# Patient Record
Sex: FEMALE | Race: WHITE | NOT HISPANIC OR LATINO | Employment: PART TIME | URBAN - METROPOLITAN AREA
[De-identification: names, ages, dates, MRNs, and addresses within clinical notes are randomized per-mention and may not be internally consistent; named-entity substitution may affect disease eponyms.]

---

## 2018-01-11 ENCOUNTER — RECORDS - HEALTHEAST (OUTPATIENT)
Dept: LAB | Facility: CLINIC | Age: 28
End: 2018-01-11

## 2018-01-12 LAB
HSV1 IGG SERPL QL IA: NEGATIVE
HSV2 IGG SERPL QL IA: NEGATIVE

## 2018-01-13 LAB
HSV 1, PCR - HISTORICAL: NEGATIVE
HSV TYPE 2 PCR: NEGATIVE
SPECIMEN SOURCE: NORMAL

## 2020-08-14 ENCOUNTER — RECORDS - HEALTHEAST (OUTPATIENT)
Dept: LAB | Facility: CLINIC | Age: 30
End: 2020-08-14

## 2020-08-14 LAB — C REACTIVE PROTEIN LHE: <0.1 MG/DL (ref 0–0.8)

## 2022-02-02 ENCOUNTER — LAB REQUISITION (OUTPATIENT)
Dept: LAB | Facility: CLINIC | Age: 32
End: 2022-02-02

## 2022-02-02 DIAGNOSIS — J02.9 ACUTE PHARYNGITIS, UNSPECIFIED: ICD-10-CM

## 2022-02-02 PROCEDURE — 87081 CULTURE SCREEN ONLY: CPT | Performed by: PHYSICIAN ASSISTANT

## 2022-02-05 LAB — BACTERIA SPEC CULT: NORMAL

## 2022-11-01 PROCEDURE — 99284 EMERGENCY DEPT VISIT MOD MDM: CPT

## 2022-11-02 ENCOUNTER — HOSPITAL ENCOUNTER (EMERGENCY)
Facility: HOSPITAL | Age: 32
Discharge: HOME | End: 2022-11-02
Attending: EMERGENCY MEDICINE | Admitting: EMERGENCY MEDICINE
Payer: COMMERCIAL

## 2022-11-02 VITALS
WEIGHT: 160 LBS | BODY MASS INDEX: 25.06 KG/M2 | DIASTOLIC BLOOD PRESSURE: 71 MMHG | TEMPERATURE: 98.7 F | RESPIRATION RATE: 22 BRPM | OXYGEN SATURATION: 100 % | HEART RATE: 98 BPM | SYSTOLIC BLOOD PRESSURE: 125 MMHG

## 2022-11-02 DIAGNOSIS — F41.9 ANXIETY: ICD-10-CM

## 2022-11-02 PROBLEM — Z87.891 FORMER SMOKER: Status: ACTIVE | Noted: 2019-05-21

## 2022-11-02 PROBLEM — F40.8 OTHER PHOBIC ANXIETY DISORDERS: Status: ACTIVE | Noted: 2019-05-21

## 2022-11-02 PROBLEM — R00.2 HEART PALPITATIONS: Status: ACTIVE | Noted: 2019-05-21

## 2022-11-02 PROBLEM — R00.0 TACHYCARDIA: Status: ACTIVE | Noted: 2019-05-21

## 2022-11-02 RX ORDER — HYDROXYZINE HYDROCHLORIDE 25 MG/1
25 TABLET, FILM COATED ORAL EVERY 6 HOURS PRN
Qty: 12 TABLET | Refills: 0 | Status: SHIPPED | OUTPATIENT
Start: 2022-11-02

## 2022-11-02 RX ORDER — LORAZEPAM 0.5 MG/1
0.5 TABLET ORAL EVERY 6 HOURS PRN
Qty: 6 TABLET | Refills: 0 | Status: SHIPPED | OUTPATIENT
Start: 2022-11-02

## 2022-11-02 RX ORDER — BUSPIRONE HYDROCHLORIDE 5 MG/1
5 TABLET ORAL 2 TIMES DAILY
Qty: 14 TABLET | Refills: 0 | Status: SHIPPED | OUTPATIENT
Start: 2022-11-02 | End: 2024-03-18

## 2022-11-02 ASSESSMENT — ACTIVITIES OF DAILY LIVING (ADL): ADLS_ACUITY_SCORE: 33

## 2022-11-02 NOTE — DISCHARGE INSTRUCTIONS
The Ativan is to help break cycle of anxiety, you take 1 dose every 6 hours, I do not recommend taking this daily however.    Try the hydroxyzine this may also help with sleep as well.    Please talk to therapist about alternatives to inpatient admission, which could be day programs, outpatient intensive therapy.    It will be important to move forward with a psychiatrist, they are the best resource for medications.

## 2022-11-02 NOTE — ED PROVIDER NOTES
EMERGENCY DEPARTMENT ENCOUNTER      NAME: Carrol Momin  AGE: 32 year old female  YOB: 1990  MRN: [id number]  EVALUATION DATE & TIME: 2022 12:29 AM    PCP: Clinic, [facility] [facility]    ED PROVIDER: Min oTny M.D.      Chief Complaint   Patient presents with     Mental Health Problem         FINAL IMPRESSION:  1. Anxiety          ED COURSE & MEDICAL DECISION MAKING:    Pertinent Labs & Imaging studies reviewed. (See chart for details)  ED Course as of 22 0610      0104 Patient is a 32-year-old with history of general anxiety disorder, here with his significant anxiety, baseline phobia of taking medications.  She would like refills of her medications that  2 years ago.  She has few doses of hydroxyzine, Ativan left.  Reports never taking any of the Ativan at home.  She is anxious, rubbing her hands on her knees.  Denies SI.  I encouraged a dose of Ativan here which she declined, but I sent her home with a few tablets of both medications as well as low-dose BuSpar.  I encouraged her to follow-up with psychiatry and her therapist for continued work with her anxiety as an outpatient.  We discussed return precautions.         Additional ED Course Timestamps:  12:47 AM I met with the patient, obtained history, performed an initial exam, and discussed options and plan for diagnostics and treatment here in the ED.  1:02 AM We discussed the plan for discharge and the patient is agreeable. Reviewed supportive cares, symptomatic treatment, outpatient follow up, and reasons to return to the Emergency Department. Patient to be discharged by ED RN.         At the conclusion of the encounter I discussed the results of all of the tests and the disposition. The questions were answered. The patient or family acknowledged understanding and was agreeable with the care plan.       MEDICATIONS GIVEN IN THE EMERGENCY:  Medications - No data to display      NEW PRESCRIPTIONS  STARTED AT TODAY'S ER VISIT  Discharge Medication List as of 11/2/2022  1:05 AM      START taking these medications    Details   busPIRone (BUSPAR) 5 MG tablet Take 1 tablet (5 mg) by mouth 2 times daily, Disp-14 tablet, R-0, Local Print      hydrOXYzine (ATARAX) 25 MG tablet Take 1 tablet (25 mg) by mouth every 6 hours as needed for anxiety, Disp-12 tablet, R-0, Local Print      !! LORazepam (ATIVAN) 0.5 MG tablet Take 1 tablet (0.5 mg) by mouth every 6 hours as needed for anxiety, Disp-6 tablet, R-0, Local Print       !! - Potential duplicate medications found. Please discuss with provider.             =================================================================    HPI    Patient information was obtained from: Patient     Use of : N/A         Carrol Momin is a 32 year old female with a pertinent history of general anxiety disorder, agoraphobia and pharmacophobia, who presents to this ED for evaluation of anxiety.     Today, patient states she was panicking all day.  She has been seen for similar episode in 2019 and was prescribed hydroxyzine and Ativan but states she has a fear over taking any medications, and has not taken any of the medications. Patient has been seeing a therapist for some time and states she has been doing better but in recent times, patient notes an increase of stress in her life causing her to be more anxious. She has not been able to sleep well. Patient was at [facility] earlier today and wanted to get admitted.     Of note, patient does not have a psychiatrist. Patient denies suicidal ideation.       Mental Health Risk Assessment      PSS-3    Date and Time Over the past 2 weeks have you felt down, depressed, or hopeless? Over the past 2 weeks have you had thoughts of killing yourself? Have you ever attempted to kill yourself? When did this last happen? User   11/01/22 3329 yes no yes more than 6 months ago       C-SSRS ([place])    Date and Time Q1 Wished to be Dead  (Past Month) Q2 Suicidal Thoughts (Past Month) Q3 Suicidal Thought Method Q4 Suicidal Intent without Specific Plan Q5 Suicide Intent with Specific Plan Q6 Suicide Behavior (Lifetime) Within the Past 3 Months? RETIRED: Level of Risk per Screen Screening Not Complete User   11/02/22 0114 no no no no no no -- -- -- DMV                  REVIEW OF SYSTEMS   Review of Systems   Psychiatric/Behavioral: Negative for suicidal ideas.        Positive for anxious   All other systems reviewed and are negative.       PAST MEDICAL HISTORY:  History reviewed. No pertinent past medical history.    PAST SURGICAL HISTORY:  History reviewed. No pertinent surgical history.        CURRENT MEDICATIONS:    No current facility-administered medications for this encounter.     Current Outpatient Medications   Medication     busPIRone (BUSPAR) 5 MG tablet     hydrOXYzine (ATARAX) 25 MG tablet     LORazepam (ATIVAN) 0.5 MG tablet     LORazepam (ATIVAN) 1 MG tablet       ALLERGIES:  Allergies   Allergen Reactions     Pineapple Unknown       FAMILY HISTORY:  History reviewed. No pertinent family history.    SOCIAL HISTORY:   Social History     Socioeconomic History     Marital status: Single       VITALS:  /71   Pulse 98   Temp 98.7  F (37.1  C) (Oral)   Resp 22   Wt 72.6 kg (160 lb)   SpO2 100%   BMI 25.06 kg/m      PHYSICAL EXAM    Constitutional: Well developed, well nourished. Anxious appearing  HENT: Normocephalic, atraumatic, mucous membranes moist, nose normal  Eyes: PERRL, EOMI, conjunctiva normal, no discharge.  Neck- Supple, gross ROM intact.   Respiratory: Normal work of breathing, normal rate, speaks in full sentences  Cardiovascular: Normal heart rate  Musculoskeletal: Moving all 4 extremities intentionally and without pain.  Neurologic: Alert & oriented x 3, cranial nerves grossly intact. Normal gross coordination  Psychiatric: Affect normal, cooperative. Denies suicidal ideation.        LAB:  All pertinent labs reviewed  and interpreted.  Labs Ordered and Resulted from Time of ED Arrival to Time of ED Departure - No data to display    RADIOLOGY:  Reviewed all pertinent imaging. Please see official radiology report.  No orders to display       EKG:    All EKG interpretations will be found in ED course above.    PROCEDURES:   None      I, Melany Bauer am serving as a scribe to document services personally performed by Dr. Min Tony based on my observation and the provider's statements to me. I, Min Tony MD attest that Melany Bauer is acting in a scribe capacity, has observed my performance of the services and has documented them in accordance with my direction.    Min Tony M.D.  Emergency Medicine  [facility] EMERGENCY DEPARTMENT  [facility] [facility] [address]  [phone]  Dept: [phone]     Min Tony MD  11/02/22 0611

## 2022-11-02 NOTE — ED TRIAGE NOTES
Pt arrives with her boyfriend from home for evaluation of a mental health crisis. Pt feels like she is having a severe panic attack, it started around 2pm today. She was able to see her therapist around 6pm who helped calm her down temperoraly. Her therapist recommend she come in to be seen and placed on mediations (reccomending Buspar). Pt is currently not taking anything, as she has a fear of medications. She has prescriptions from 2019 for Hydroxyzine and Ativan for in emergency's, but she has not been able to get herself to take them and is also worried they are  by now. She denies any SI at this time, which she reports she often does have with these attacks. SI attempt within the last year. Pt is hoping to receive inpatient MH treatment.

## 2022-12-30 ENCOUNTER — LAB REQUISITION (OUTPATIENT)
Dept: LAB | Facility: CLINIC | Age: 32
End: 2022-12-30
Payer: COMMERCIAL

## 2022-12-30 ENCOUNTER — LAB REQUISITION (OUTPATIENT)
Dept: LAB | Facility: CLINIC | Age: 32
End: 2022-12-30

## 2022-12-30 DIAGNOSIS — Z12.4 ENCOUNTER FOR SCREENING FOR MALIGNANT NEOPLASM OF CERVIX: ICD-10-CM

## 2022-12-30 DIAGNOSIS — J02.9 ACUTE PHARYNGITIS, UNSPECIFIED: ICD-10-CM

## 2022-12-30 PROCEDURE — G0145 SCR C/V CYTO,THINLAYER,RESCR: HCPCS | Performed by: FAMILY MEDICINE

## 2022-12-30 PROCEDURE — U0003 INFECTIOUS AGENT DETECTION BY NUCLEIC ACID (DNA OR RNA); SEVERE ACUTE RESPIRATORY SYNDROME CORONAVIRUS 2 (SARS-COV-2) (CORONAVIRUS DISEASE [COVID-19]), AMPLIFIED PROBE TECHNIQUE, MAKING USE OF HIGH THROUGHPUT TECHNOLOGIES AS DESCRIBED BY CMS-2020-01-R: HCPCS | Mod: ORL | Performed by: FAMILY MEDICINE

## 2022-12-30 PROCEDURE — 87624 HPV HI-RISK TYP POOLED RSLT: CPT | Performed by: FAMILY MEDICINE

## 2022-12-31 LAB — SARS-COV-2 RNA RESP QL NAA+PROBE: NEGATIVE

## 2023-01-03 LAB
BKR LAB AP GYN ADEQUACY: NORMAL
BKR LAB AP GYN INTERPRETATION: NORMAL
BKR LAB AP HPV REFLEX: NORMAL
BKR LAB AP LMP: NORMAL
BKR LAB AP PREVIOUS ABNORMAL: NORMAL
PATH REPORT.COMMENTS IMP SPEC: NORMAL
PATH REPORT.COMMENTS IMP SPEC: NORMAL
PATH REPORT.RELEVANT HX SPEC: NORMAL

## 2023-01-04 LAB
HUMAN PAPILLOMA VIRUS 16 DNA: NEGATIVE
HUMAN PAPILLOMA VIRUS 18 DNA: NEGATIVE
HUMAN PAPILLOMA VIRUS FINAL DIAGNOSIS: ABNORMAL
HUMAN PAPILLOMA VIRUS OTHER HR: POSITIVE

## 2024-01-05 ENCOUNTER — TELEPHONE (OUTPATIENT)
Dept: UROLOGY | Facility: CLINIC | Age: 34
End: 2024-01-05
Payer: COMMERCIAL

## 2024-01-05 NOTE — TELEPHONE ENCOUNTER
[facility] Call Center    Phone Message    May a detailed message be left on voicemail: yes     Reason for Call: hormone therapy and wants appt with Janny, please advise   Legal name being Varinder and has brought in papers  Action Taken: Other: urology    Travel Screening: Not Applicable

## 2024-01-08 ENCOUNTER — TELEPHONE (OUTPATIENT)
Dept: PLASTIC SURGERY | Facility: CLINIC | Age: 34
End: 2024-01-08
Payer: COMMERCIAL

## 2024-01-08 NOTE — CONFIDENTIAL NOTE
Writer CURTIS re: request to schedule gender care. Pt requested HRT and consult with Dr. Soliman. Legal name is Varinder, but pt needs to send us court order.

## 2024-01-08 NOTE — CONFIDENTIAL NOTE
Pt called back to discuss HRT. Writer discussed options and pt selected [facility] as closest and ideal option. Pt reports having agoraphobia so prefers the clinic closer to their home. Provider gave Varinder phone number to call for scheduling and pt to schedule with Dr. Weber.

## 2024-01-09 ENCOUNTER — HOSPITAL ENCOUNTER (EMERGENCY)
Facility: HOSPITAL | Age: 34
Discharge: HOME | End: 2024-01-09
Attending: EMERGENCY MEDICINE | Admitting: EMERGENCY MEDICINE
Payer: COMMERCIAL

## 2024-01-09 ENCOUNTER — LAB REQUISITION (OUTPATIENT)
Dept: LAB | Facility: CLINIC | Age: 34
End: 2024-01-09

## 2024-01-09 VITALS
HEART RATE: 92 BPM | RESPIRATION RATE: 16 BRPM | HEIGHT: 68 IN | DIASTOLIC BLOOD PRESSURE: 70 MMHG | OXYGEN SATURATION: 99 % | SYSTOLIC BLOOD PRESSURE: 137 MMHG | BODY MASS INDEX: 23.19 KG/M2 | TEMPERATURE: 98.4 F | WEIGHT: 153 LBS

## 2024-01-09 DIAGNOSIS — N30.01 ACUTE CYSTITIS WITH HEMATURIA: ICD-10-CM

## 2024-01-09 DIAGNOSIS — R39.9 UNSPECIFIED SYMPTOMS AND SIGNS INVOLVING THE GENITOURINARY SYSTEM: ICD-10-CM

## 2024-01-09 PROCEDURE — 250N000009 HC RX 250: Performed by: EMERGENCY MEDICINE

## 2024-01-09 PROCEDURE — 96372 THER/PROPH/DIAG INJ SC/IM: CPT | Performed by: EMERGENCY MEDICINE

## 2024-01-09 PROCEDURE — 99284 EMERGENCY DEPT VISIT MOD MDM: CPT | Mod: 25

## 2024-01-09 PROCEDURE — 87186 SC STD MICRODIL/AGAR DIL: CPT | Performed by: STUDENT IN AN ORGANIZED HEALTH CARE EDUCATION/TRAINING PROGRAM

## 2024-01-09 PROCEDURE — 250N000011 HC RX IP 250 OP 636: Performed by: EMERGENCY MEDICINE

## 2024-01-09 PROCEDURE — 250N000013 HC RX MED GY IP 250 OP 250 PS 637: Performed by: EMERGENCY MEDICINE

## 2024-01-09 RX ORDER — PHENAZOPYRIDINE HYDROCHLORIDE 200 MG/1
200 TABLET, FILM COATED ORAL 3 TIMES DAILY
Qty: 9 TABLET | Refills: 0 | Status: SHIPPED | OUTPATIENT
Start: 2024-01-09 | End: 2024-01-12

## 2024-01-09 RX ORDER — PHENAZOPYRIDINE HYDROCHLORIDE 100 MG/1
200 TABLET, FILM COATED ORAL ONCE
Status: COMPLETED | OUTPATIENT
Start: 2024-01-09 | End: 2024-01-09

## 2024-01-09 RX ORDER — CEFDINIR 300 MG/1
300 CAPSULE ORAL 2 TIMES DAILY
Qty: 14 CAPSULE | Refills: 0 | Status: SHIPPED | OUTPATIENT
Start: 2024-01-09 | End: 2024-01-16

## 2024-01-09 RX ORDER — KETOROLAC TROMETHAMINE 30 MG/ML
30 INJECTION, SOLUTION INTRAMUSCULAR; INTRAVENOUS ONCE
Status: COMPLETED | OUTPATIENT
Start: 2024-01-09 | End: 2024-01-09

## 2024-01-09 RX ORDER — CEFDINIR 300 MG/1
300 CAPSULE ORAL ONCE
Status: DISCONTINUED | OUTPATIENT
Start: 2024-01-09 | End: 2024-01-09

## 2024-01-09 RX ADMIN — KETOROLAC TROMETHAMINE 30 MG: 30 INJECTION, SOLUTION INTRAMUSCULAR; INTRAVENOUS at 22:07

## 2024-01-09 RX ADMIN — PHENAZOPYRIDINE 200 MG: 100 TABLET ORAL at 22:05

## 2024-01-09 RX ADMIN — LIDOCAINE HYDROCHLORIDE 1 G: 10 INJECTION, SOLUTION INFILTRATION; PERINEURAL at 22:14

## 2024-01-10 NOTE — ED TRIAGE NOTES
Patient was seen at clinic 1 hour ago and was diagnosed with a UTI. Abx was sent to pharmacy. Patient states when she returned home there was blood in her urine and bladder became very painful. Patient states nurse referred her to ER if she noted blood in her urine.     Triage Assessment (Adult)       Row Name 01/09/24 5938          Triage Assessment    Airway WDL WDL        Respiratory WDL    Respiratory WDL WDL        Skin Circulation/Temperature WDL    Skin Circulation/Temperature WDL WDL        Cardiac WDL    Cardiac WDL WDL        Peripheral/Neurovascular WDL    Peripheral Neurovascular WDL WDL        Cognitive/Neuro/Behavioral WDL    Cognitive/Neuro/Behavioral WDL WDL

## 2024-01-10 NOTE — ED PROVIDER NOTES
EMERGENCY DEPARTMENT ENCOUNTER      NAME: Varinder Momin  AGE: 33 year old adult  YOB: 1990  MRN: [id number]  EVALUATION DATE & TIME: No admission date for patient encounter.    PCP: [facility]    ED PROVIDER: Kristine Bradley MD    Chief Complaint   Patient presents with    Hematuria         FINAL IMPRESSION:  1. Acute cystitis with hematuria          ED COURSE & MEDICAL DECISION MAKING:    Pertinent Labs & Imaging studies reviewed. (See chart for details)  33 year old adult with history of male to female transgender patient, anxiety who presents to the Emergency Department for evaluation of several days of small-volume urinary frequency dysuria.  Seen in entire today diagnosed with UTI and prescription for antibiotic called into pharmacy.  In the interim however patient has developed hematuria.  Symptoms you can seem consistent with urinary tract infection.  I am not able to see the urinalysis that was obtained from clinic earlier today but I do see a urine culture in progress.  Patient is clearly symptomatic and has not received any antibiotics yet.  Given dose of IM Rocephin here as well as IM Toradol and Pyridium.  I am not sure what they called into [facility].  Does have history of insertive anal intercourse would certainly need to [facility] for E. coli.  Discharged home with prescription for Omnicef.  Warning signs return to ED discussed.      ED Course as of 01/09/24 2237 Tue Jan 09, 2024 2121 I introduced myself to the patient, obtained patient history, performed a physical exam, and discussed plan for ED workup including potential diagnostic laboratory/imaging studies and interventions.     2136 We discussed the plan for discharge and the patient is agreeable. Reviewed supportive cares, symptomatic treatment, outpatient follow up, and reasons to return to the Emergency Department. Patient to be discharged by ED RN.          Medical Decision Making    History:  Supplemental  history from: Family Member/Significant Other  External Record(s) reviewed: Prior Labs: Urine culture today in process not resulted    Work Up:  Chart documentation includes differential considered and any EKGs or imaging independently interpreted by provider, see MDM  In additional to work up documented, I considered the following work up: see MDM    External consultation:  Discussion of management with another provider: N/A    Complicating factors:  Care impacted by chronic illness: Mental Health  Care affected by social determinants of health: Access to Medical Care and Access to Affordable Health Care    Disposition considerations: Discharge. I prescribed additional prescription strength medication(s) as charted. See documentation for any additional details.        At the conclusion of the encounter I discussed the results of all of the tests and the disposition. The questions were answered. The patient or family acknowledged understanding and was agreeable with the care plan.      MEDICATIONS GIVEN IN THE EMERGENCY:  Medications   cefTRIAXone (ROCEPHIN) 1 g in lidocaine injection (1 g Intramuscular $Given 1/9/24 2214)   ketorolac (TORADOL) injection 30 mg (30 mg Intramuscular $Given 1/9/24 2207)   phenazopyridine (PYRIDIUM) tablet 200 mg (200 mg Oral $Given 1/9/24 2205)       NEW PRESCRIPTIONS STARTED AT TODAY'S ER VISIT  Discharge Medication List as of 1/9/2024  9:49 PM        START taking these medications    Details   cefdinir (OMNICEF) 300 MG capsule Take 1 capsule (300 mg) by mouth 2 times daily for 7 days, Disp-14 capsule, R-0, Local Print      phenazopyridine (PYRIDIUM) 200 MG tablet Take 1 tablet (200 mg) by mouth 3 times daily for 3 days, Disp-9 tablet, R-0, Local Print                =================================================================    HPI    Patient information was obtained from: Patient     Use of Intrepreter: N/A       Varinder Momin is a 33 year old adult with pertinent medical  "history of anxiety who presents with UTI    Patient reports having dysuria for days saying \"it fells like I'm pissing glass\". Patient went to clinic PTA and was diagnosed with UTI and was given antibiotics. Patient went home and then started having hematuria, so she came to ER. Patient also endorses lower abdominal pain, and loss of appetite. Patient also had insertive anal intercourse. Patient has had loose stools. Patient denies flank pain, nausea, vomiting.       PAST MEDICAL HISTORY:  No past medical history on file.    PAST SURGICAL HISTORY:  No past surgical history on file.    CURRENT MEDICATIONS:    Prior to Admission Medications   Prescriptions Last Dose Informant Patient Reported? Taking?   LORazepam (ATIVAN) 0.5 MG tablet   No No   Sig: Take 1 tablet (0.5 mg) by mouth every 6 hours as needed for anxiety   LORazepam (ATIVAN) 1 MG tablet   No No   Sig: [LORAZEPAM (ATIVAN) 1 MG TABLET] Take 0.5 tablets (0.5 mg total) by mouth every 8 (eight) hours as needed for anxiety.   busPIRone (BUSPAR) 5 MG tablet   No No   Sig: Take 1 tablet (5 mg) by mouth 2 times daily   hydrOXYzine (ATARAX) 25 MG tablet   No No   Sig: Take 1 tablet (25 mg) by mouth every 6 hours as needed for anxiety      Facility-Administered Medications: None       ALLERGIES:  Allergies   Allergen Reactions    Pineapple Unknown       FAMILY HISTORY:  No family history on file.    SOCIAL HISTORY:        VITALS:  Patient Vitals for the past 24 hrs:   BP Temp Temp src Pulse Resp SpO2 Height Weight   01/09/24 2217 137/70 -- -- 92 16 99 % -- --   01/09/24 2112 133/65 98.4  F (36.9  C) Temporal 84 18 100 % 1.727 m (5' 8\") 69.4 kg (153 lb)       PHYSICAL EXAM    General Appearance: Well-appearing, well-nourished, no acute distress   Head:  Normocephalic  Cardio:  Regular rate and rhythm  Pulm:  No respiratory distress  Back:  No CVA tenderness, normal ROM  Abdomen:  Mild suprapubic tenderness.  non distended,no rebound or guarding.  Extremities: Normal " gait  Neuro:  Alert and oriented ×3      The creation of this record is based on the scribe s observations of the work being performed by Kristine Bradley MD and the provider s statements to them. It was created on her behalf by Johnny Young, a trained medical scribe. This document has been checked and approved by the attending provider.    Kristine Bradley MD  Emergency Medicine  [facility] EMERGENCY DEPARTMENT  [address] [place] [id number] [phone]  Dept: [phone]       Kristine Bradley MD  01/09/24 8634

## 2024-01-10 NOTE — DISCHARGE INSTRUCTIONS
Ibuprofen 800 mg 3 times daily as needed for pain.  Tylenol 1000 mg 4 times daily as needed for pain.  Pyridium as needed for painful urination.  Take antibiotics as prescribed.

## 2024-01-11 ENCOUNTER — LAB REQUISITION (OUTPATIENT)
Dept: LAB | Facility: CLINIC | Age: 34
End: 2024-01-11

## 2024-01-11 PROCEDURE — 87329 GIARDIA AG IA: CPT | Performed by: STUDENT IN AN ORGANIZED HEALTH CARE EDUCATION/TRAINING PROGRAM

## 2024-01-12 LAB — BACTERIA UR CULT: ABNORMAL

## 2024-01-15 LAB
C PARVUM AG STL QL IA: NEGATIVE
G LAMBLIA AG STL QL IA: NEGATIVE

## 2024-03-18 ENCOUNTER — OFFICE VISIT (OUTPATIENT)
Dept: FAMILY MEDICINE | Facility: CLINIC | Age: 34
End: 2024-03-18
Payer: COMMERCIAL

## 2024-03-18 ENCOUNTER — PATIENT OUTREACH (OUTPATIENT)
Dept: ONCOLOGY | Facility: CLINIC | Age: 34
End: 2024-03-18

## 2024-03-18 VITALS
SYSTOLIC BLOOD PRESSURE: 128 MMHG | HEART RATE: 84 BPM | WEIGHT: 151 LBS | OXYGEN SATURATION: 99 % | BODY MASS INDEX: 22.36 KG/M2 | RESPIRATION RATE: 18 BRPM | HEIGHT: 69 IN | DIASTOLIC BLOOD PRESSURE: 60 MMHG | TEMPERATURE: 99 F

## 2024-03-18 DIAGNOSIS — Z80.3 FAMILY HISTORY OF MALIGNANT NEOPLASM OF BREAST: ICD-10-CM

## 2024-03-18 DIAGNOSIS — F33.1 MODERATE RECURRENT MAJOR DEPRESSION (H): ICD-10-CM

## 2024-03-18 DIAGNOSIS — F41.1 GAD (GENERALIZED ANXIETY DISORDER): ICD-10-CM

## 2024-03-18 DIAGNOSIS — F64.9 GENDER DYSPHORIA: ICD-10-CM

## 2024-03-18 DIAGNOSIS — Z78.9 FEMALE-TO-MALE TRANSGENDER PERSON: Primary | ICD-10-CM

## 2024-03-18 LAB
ERYTHROCYTE [DISTWIDTH] IN BLOOD BY AUTOMATED COUNT: 12.4 % (ref 10–15)
HCT VFR BLD AUTO: 41.1 % (ref 35–53)
HGB BLD-MCNC: 13.9 G/DL (ref 11.7–17.7)
MCH RBC QN AUTO: 28.5 PG (ref 26.5–33)
MCHC RBC AUTO-ENTMCNC: 33.8 G/DL (ref 31.5–36.5)
MCV RBC AUTO: 84 FL (ref 78–100)
PLATELET # BLD AUTO: 288 10E3/UL (ref 150–450)
RBC # BLD AUTO: 4.87 10E6/UL (ref 3.8–5.9)
WBC # BLD AUTO: 7.5 10E3/UL (ref 4–11)

## 2024-03-18 PROCEDURE — 99203 OFFICE O/P NEW LOW 30 MIN: CPT | Performed by: STUDENT IN AN ORGANIZED HEALTH CARE EDUCATION/TRAINING PROGRAM

## 2024-03-18 PROCEDURE — 96127 BRIEF EMOTIONAL/BEHAV ASSMT: CPT | Performed by: STUDENT IN AN ORGANIZED HEALTH CARE EDUCATION/TRAINING PROGRAM

## 2024-03-18 PROCEDURE — 84443 ASSAY THYROID STIM HORMONE: CPT | Performed by: STUDENT IN AN ORGANIZED HEALTH CARE EDUCATION/TRAINING PROGRAM

## 2024-03-18 PROCEDURE — 85027 COMPLETE CBC AUTOMATED: CPT | Performed by: STUDENT IN AN ORGANIZED HEALTH CARE EDUCATION/TRAINING PROGRAM

## 2024-03-18 PROCEDURE — 36415 COLL VENOUS BLD VENIPUNCTURE: CPT | Performed by: STUDENT IN AN ORGANIZED HEALTH CARE EDUCATION/TRAINING PROGRAM

## 2024-03-18 PROCEDURE — 80053 COMPREHEN METABOLIC PANEL: CPT | Performed by: STUDENT IN AN ORGANIZED HEALTH CARE EDUCATION/TRAINING PROGRAM

## 2024-03-18 PROCEDURE — 82306 VITAMIN D 25 HYDROXY: CPT | Performed by: STUDENT IN AN ORGANIZED HEALTH CARE EDUCATION/TRAINING PROGRAM

## 2024-03-18 RX ORDER — ESCITALOPRAM OXALATE 5 MG/1
1 TABLET ORAL
COMMUNITY
Start: 2023-11-28

## 2024-03-18 RX ORDER — ZOLPIDEM TARTRATE 5 MG/1
TABLET ORAL
COMMUNITY
Start: 2024-02-20

## 2024-03-18 ASSESSMENT — PAIN SCALES - GENERAL: PAINLEVEL: NO PAIN (0)

## 2024-03-18 ASSESSMENT — PATIENT HEALTH QUESTIONNAIRE - PHQ9
SUM OF ALL RESPONSES TO PHQ QUESTIONS 1-9: 12
SUM OF ALL RESPONSES TO PHQ QUESTIONS 1-9: 12
10. IF YOU CHECKED OFF ANY PROBLEMS, HOW DIFFICULT HAVE THESE PROBLEMS MADE IT FOR YOU TO DO YOUR WORK, TAKE CARE OF THINGS AT HOME, OR GET ALONG WITH OTHER PEOPLE: VERY DIFFICULT

## 2024-03-18 NOTE — PROGRESS NOTES
Writer received referral to Cancer Risk Management/Genetic Counseling.    Referred for: Family history of malignant neoplasm of breast      Referral reviewed for appropriate plan, and sent to New Patient Scheduling ([phone]) for completion.    Fabienne Martini, RN, BSN  Oncology New Patient Nurse Navigator   [facility] [place]  [phone]

## 2024-03-18 NOTE — PROGRESS NOTES
Assessment & Plan   Problem List Items Addressed This Visit    None  Visit Diagnoses       Female-to-male transgender person    -  Primary    Relevant Orders    Adult Endocrinology  Referral    Gender dysphoria        Relevant Orders    Adult Endocrinology  Referral    Family history of malignant neoplasm of breast        Relevant Orders    Adult Genetics & Metabolism  Referral    SANDRO (generalized anxiety disorder)        Relevant Medications    escitalopram (LEXAPRO) 5 MG tablet    Other Relevant Orders    Comprehensive metabolic panel (BMP + Alb, Alk Phos, ALT, AST, Total. Bili, TP)    TSH with free T4 reflex    CBC with platelets (Completed)    Vitamin D Deficiency    Moderate recurrent major depression (H)        Relevant Medications    escitalopram (LEXAPRO) 5 MG tablet    Other Relevant Orders    CA BEHAV ASSMT W/SCORE & DOCD/STAND INSTRUMENT (Completed)          SANDRO  MDD  PHQ-9 score 12  No active SI/HI  -Supportive listening provided  - It sounds like his psychiatrist is working on getting him medications.  Did mention that he could trial mirtazapine which dissolves underneath his tongue and he can sleep through the initial activating side effects.  Patient notes that he would like to discuss it with his psychiatrist.  - Did also discuss propranolol.  It is technically not an SSRI and does not change brain chemistry.  However, it does help with anxiety.  Patient is interested in trialing it.  I have prescribed it today and a psychiatrist may continue it should he feel that is appropriate.  -Patient was also interested in getting potential blood work to make sure no secondary etiology for ongoing anxiety is identified.  Ordered.    Transgender female to male  Gender dysphoria  Reviewed with patient that I do not have any background and dispensing hormones for gender care.  Reviewed in general terms potential side effects from testosterone should he desire to start it.  Ultimately,  "I think he will be able to get the information he desires from endocrinology.  I have placed the referral.  He is also interested in top surgery in the future but would like to hold for now.  He would like to optimize his mental health before moving forward with top surgery.    Family history of breast cancer   Every female on his mother side has had breast cancer mom's side has breast cancer   He is unaware if they have BRCA  Patient worries about getting breast cancer and would like to see genetics for cancer risk stratification.  Referral placed.    Patient will continue to follow-up with his PCP and psychiatric team for future visits.        35 minutes spent by me on the date of the encounter doing chart review, history and exam, documentation and further activities per the note     Depression Screening Follow Up        3/18/2024     1:11 PM   PHQ   PHQ-9 Total Score 12   Q9: Thoughts of better off dead/self-harm past 2 weeks Not at all       Subjective   Varinder is a 34 year old, presenting for the following health issues:  Establish Care and Depression        3/18/2024     1:22 PM   Additional Questions   Roomed by khoi   Accompanied by boyfriend     Patient is a pleasant 34-year-old female to male transgender individual who presents today for discussion of anxiety and wanting to know more information about transgender care.    Patient has had gender dysphoria for \"a long time\".  Also suffers from \"a lot of anxiety\".  Has been working with a psychiatrist and a therapist for a long time.  He has been working with them to get his anxiety under control.  His psychiatrist feels he would benefit from medications but he suffers from extreme phobia of taking any medication that might \"affect his brain\".  Has been working on getting over his phobia.  In the interim, he has had ongoing gender dysphoria and he is looking for more information potentially starting hormones and transitioning to a male.    He has not received " "any gender care in the past.  He is wondering if he needs to \"get his anxiety under control\" before \"transitioning\"        Review of Systems  As per HPI       Objective    /60 (BP Location: Right arm, Patient Position: Sitting)   Pulse 84   Temp 99  F (37.2  C) (Oral)   Resp 18   Ht 1.74 m (5' 8.5\")   Wt 68.5 kg (151 lb)   LMP 02/26/2024 (Exact Date)   SpO2 99%   BMI 22.62 kg/m    Body mass index is 22.62 kg/m .  Physical Exam   GENERAL: alert and no distress  MS: no gross musculoskeletal defects noted, no edema  PSYCH: mentation appears normal, affect  anxious, slightly circular reasoning but overall appropriate, good insight        Signed Electronically by: Josi Pate DO    "

## 2024-03-18 NOTE — LETTER
"March 20, 2024      Varinder Momin  [address]    [place] [address]        Dear ,    We are writing to inform you of your test results.    All your bloodwork is reassuring. No further intervention indicated at this time     Resulted Orders   Comprehensive metabolic panel (BMP + Alb, Alk Phos, ALT, AST, Total. Bili, TP)   Result Value Ref Range    Sodium 139 135 - 145 mmol/L      Comment:      Reference intervals for this test were updated on 09/26/2023 to more accurately reflect our healthy population. There may be differences in the flagging of prior results with similar values performed with this method. Interpretation of those prior results can be made in the context of the updated reference intervals.     Potassium 4.3 3.4 - 5.3 mmol/L    Carbon Dioxide (CO2) 23 22 - 29 mmol/L    Anion Gap 13 7 - 15 mmol/L    Urea Nitrogen 16.1 6.0 - 20.0 mg/dL    Creatinine 0.88 0.51 - 1.17 mg/dL      Comment:      Male and Female  0-2 Months    0.31-0.88 mg/dL  2-12 Months   0.16-0.39 mg/dL  1-2 Years     0.18-0.35 mg/dL  3-4 Years     0.26-0.42 mg/dL  5-6 Years     0.29-0.47 mg/dL  7-8 Years     0.34-0.53 mg/dL  9-10 Years    0.33-0.64 mg/dL  11-12 Years   0.44-0.68 mg/dL  13-14 Years   0.46-0.77 mg/dL    Female  15 Years and older  0.51-0.95 mg/dL    Male  15 Years and older  0.67-1.17 mg/dL        GFR Estimate 87 >60 mL/min/1.73m2      Comment:      The generation of the estimated GFR is currently based on binary male or female sex. If the electronic health record information indicates another gender identity or if Legal Sex is recorded as \"Unknown\", GFR estimates are not automatically calculated, and application of GFR equations or a direct GFR measurement should be considered according to the individual's appropriate clinical context.    Calcium 9.8 8.6 - 10.0 mg/dL    Chloride 103 98 - 107 mmol/L    Glucose 77 70 - 99 mg/dL    Alkaline Phosphatase 53 40 - 150 U/L      Comment:      Female:  " "  0-15 days     U/L  15d-1 year   122-469 U/L  1-10 years   142-335 U/L  10-13 years  129-417 U/L  13-15 years   U/L  15-17 years   U/L  17-19 years  45-87 U/L  19 years and older   U/L      Male:  0-15 days     U/L  15d-1 year   122-469 U/L  1-10 years   142-335 U/L  10-13 years  129-417 U/L  13-15 years  116-468 U/L  15-17 years   U/L  17-19 years   U/L  19 years and older   U/L    Reference intervals for this test were updated on 11/14/2023 to more accurately reflect our healthy population. There may be differences in the flagging of prior results with similar values performed with this method. Interpretation of those prior results can be made in the context of the updated reference intervals.    AST 14 0 - 45 U/L      Comment:      Reference intervals for this test were updated on 6/12/2023 to more accurately reflect our healthy population. There may be differences in the flagging of prior results with similar values performed with this method. Interpretation of those prior results can be made in the context of the updated reference intervals.    ALT 10 0 - 70 U/L      Comment:      Female   All ages       0-50 U/L     Male   0-20 Years     0-50 U/L  20-Unsp. Years 0-70 U/L      Reference intervals for this test were updated on 6/12/2023 to more accurately reflect our healthy population. There may be differences in the flagging of prior results with similar values performed with this method. Interpretation of those prior results can be made in the context of the updated reference intervals.      Protein Total 7.7 6.4 - 8.3 g/dL    Albumin 4.8 3.5 - 5.2 g/dL    Bilirubin Total 0.4 <=1.2 mg/dL    Narrative    The generation of reference intervals for this test is currently based on binary male or female sex. If the electronic health record information indicates another gender identity or if Legal Sex is recorded as \"Unknown\", both male and female reference intervals " "are provided where applicable, and should be considered according to the individual's appropriate clinical context.   TSH with free T4 reflex   Result Value Ref Range    TSH 1.36 0.30 - 4.20 uIU/mL   CBC with platelets   Result Value Ref Range    WBC Count 7.5 4.0 - 11.0 10e3/uL    RBC Count 4.87 3.80 - 5.90 10e6/uL      Comment:      Reference Range:                                                     Female 3.80-5.20 10e6/uL                                      Male 4.40-5.90 10e6u/L    Hemoglobin 13.9 11.7 - 17.7 g/dL      Comment:      Reference Range:                                                     Female 11.7-15.7 g/dL                                      Male 13.3-17.7 g/dL    Hematocrit 41.1 35.0 - 53.0 %      Comment:      Sex Specific Reference Ranges: Female  35.0-47.0 % Male  40.0-53.0 %    MCV 84 78 - 100 fL    MCH 28.5 26.5 - 33.0 pg    MCHC 33.8 31.5 - 36.5 g/dL    RDW 12.4 10.0 - 15.0 %    Platelet Count 288 150 - 450 10e3/uL    Narrative    Sex Specific Reference Ranges:     Female  35.0-47.0 %   Male      40.0-53.0 %  The generation of reference intervals for this test is currently based on binary male or female sex. If the electronic health record information indicates another gender identity or if Legal Sex is recorded as \"Unknown\", both male and female reference intervals are provided where applicable, and should be considered according to the individual's appropriate clinical context.   Vitamin D Deficiency   Result Value Ref Range    Vitamin D, Total (25-Hydroxy) 26 20 - 50 ng/mL      Comment:      optimum levels    Narrative    Season, race, dietary intake, and treatment affect the concentration of 25-hydroxy-Vitamin D. Values may decrease during winter months and increase during summer months.    Vitamin D determination is routinely performed by an immunoassay specific for 25 hydroxyvitamin D3.  If an individual is on vitamin D2(ergocalciferol) supplementation, please specify 25 OH " vitamin D2 and D3 level determination by LCMSMS test VITD23.         If you have any questions or concerns, please call the clinic at the number listed above.       Sincerely,      Josi Pate, DO

## 2024-03-19 LAB
ALBUMIN SERPL BCG-MCNC: 4.8 G/DL (ref 3.5–5.2)
ALP SERPL-CCNC: 53 U/L (ref 40–150)
ALT SERPL W P-5'-P-CCNC: 10 U/L (ref 0–70)
ANION GAP SERPL CALCULATED.3IONS-SCNC: 13 MMOL/L (ref 7–15)
AST SERPL W P-5'-P-CCNC: 14 U/L (ref 0–45)
BILIRUB SERPL-MCNC: 0.4 MG/DL
BUN SERPL-MCNC: 16.1 MG/DL (ref 6–20)
CALCIUM SERPL-MCNC: 9.8 MG/DL (ref 8.6–10)
CHLORIDE SERPL-SCNC: 103 MMOL/L (ref 98–107)
CREAT SERPL-MCNC: 0.88 MG/DL (ref 0.51–1.17)
DEPRECATED HCO3 PLAS-SCNC: 23 MMOL/L (ref 22–29)
EGFRCR SERPLBLD CKD-EPI 2021: 87 ML/MIN/1.73M2
GLUCOSE SERPL-MCNC: 77 MG/DL (ref 70–99)
POTASSIUM SERPL-SCNC: 4.3 MMOL/L (ref 3.4–5.3)
PROT SERPL-MCNC: 7.7 G/DL (ref 6.4–8.3)
SODIUM SERPL-SCNC: 139 MMOL/L (ref 135–145)
TSH SERPL DL<=0.005 MIU/L-ACNC: 1.36 UIU/ML (ref 0.3–4.2)
VIT D+METAB SERPL-MCNC: 26 NG/ML (ref 20–50)

## 2024-03-29 ENCOUNTER — TELEPHONE (OUTPATIENT)
Dept: FAMILY MEDICINE | Facility: CLINIC | Age: 34
End: 2024-03-29
Payer: COMMERCIAL

## 2024-03-29 DIAGNOSIS — F41.9 ANXIETY: Primary | ICD-10-CM

## 2024-03-29 NOTE — TELEPHONE ENCOUNTER
Patient calling to inquire about a medication that was talked about with the provider and patient thought it would be prescribed that day     Writer doesn't see the pc putting that in     Patient would like Propranolol to be placed     Contact Information  [phone] (Home Phone)

## 2024-04-01 RX ORDER — PROPRANOLOL HCL 60 MG
60 CAPSULE, EXTENDED RELEASE 24HR ORAL DAILY
Qty: 90 CAPSULE | Refills: 0 | Status: SHIPPED | OUTPATIENT
Start: 2024-04-01

## 2024-05-09 ENCOUNTER — VIRTUAL VISIT (OUTPATIENT)
Dept: ONCOLOGY | Facility: CLINIC | Age: 34
End: 2024-05-09
Attending: STUDENT IN AN ORGANIZED HEALTH CARE EDUCATION/TRAINING PROGRAM
Payer: COMMERCIAL

## 2024-05-09 DIAGNOSIS — Z80.3 FAMILY HISTORY OF MALIGNANT NEOPLASM OF BREAST: Primary | ICD-10-CM

## 2024-05-09 PROCEDURE — 96040 HC GENETIC COUNSELING, EACH 30 MINUTES: CPT | Mod: GT,95 | Performed by: GENETIC COUNSELOR, MS

## 2024-05-09 NOTE — LETTER
5/9/2024         RE: Varinder Momin  [address]  [address]  [place] [address]        Dear Colleague,    Thank you for referring your patient, Varinder Momin, to the [facility]. Please see a copy of my visit note below.    Virtual Visit Details    Type of service:  Video Visit   Video Start Time:  9:01am  Video End Time: 10:00am  Originating Location (pt. Location): Home  Distant Location (provider location):  Off-site  Platform used for Video Visit: [email or web address]    5/9/2024    Referring Provider: Josi Pate DO    Present for Today's Visit: Varinder    Presenting Information:   I met with Varinder Momin today for genetic counseling (video visit due to covid19) to discuss his family history of cancer.  He is here today to review this history, cancer screening recommendations, and available genetic testing options.    Personal History:  Varinder is a 34 year old adult. He does not have any personal history of cancer. He was assigned female at birth and is considering top surgery in the future. He just started testosterone about 5 days ago. He's seeing his doctor tomorrow for some sleep issues and gastrointestinal issues. He reports an abnormal colonoscopy about a year and a half ago (HPV+).       He had his first menstrual period at age 12, he does not have children, and is premenopausal at this time. Varinder has ovaries, fallopian tubes and uterus in place.  He reports past history of oral contraceptive use for 2-3 years and that he has never been on hormone replacement therapy.      Has not yet started mammogram or colonoscopy screening given his age. He does not regularly do any other cancer screening at this time.  Varinder reported past tobacco use (quit in 2016; 4-5 year history), and 1-2 drinks per month for alcohol use. He reports significant secondhand smoke exposure in childhood up until about age 9 or 10 and current secondhand smoke exposure in the winter. When asked about  occupational exposures, he shared that he works with [employer].     Family History: Cancer family history and pertinent information reviewed below (Please see scanned pedigree for detailed family history information)  Mother with a history of breast cancer diagnosed at age 50; treatment included lumpectomy, radiation, and tamoxifen. She also underwent ANTON-BSO.   Maternal grandmother with a history of breast cancer diagnosed in her 70's.   Paternal aunt passed in her 60's from a throat cancer. She did have smoking history.   There is no known Ashkenazi Episcopalian ancestry on either side of his family. There is no reported consanguinity.    Discussion:  Varinder's family history of cancer is suggestive of a hereditary cancer syndrome.  We reviewed the features of sporadic, familial, and hereditary cancers. In looking at Varinder's family history, it is possible that a cancer susceptibility gene is present due to his mother's early-onset breast cancer and his maternal grandmother's breast cancer history.  We discussed the natural history and genetics of hereditary cancers. A detailed handout regarding the information we discussed will be sent to Varinder in [place] mail. Topics included: inheritance pattern, cancer risks, cancer screening recommendations, and also risks, benefits and limitations of testing.  We reviewed that the most common cause of hereditary breast cancer is Hereditary Breast and Ovarian Cancer (HBOC) syndrome, which is caused by mutations in the genes BRCA1 and BRCA2. Those assigned female at birth with a mutation in either of these genes are at increased risk for female breast and ovarian cancer. There is also an increased risk for a second primary breast cancer. Those assigned male at birth with a mutation in either BRCA1 or BRCA2 are at increased risk for male breast and prostate cancer. Both may also be at increased risk for pancreatic cancer and melanoma.  We also discussed that it is always most informative to test  the people in the family who have had the cancers concerning for a hereditary component or the people most closely related to those individuals. For Varinder's family, this would be his mother. Unfortunately, Varinder's mother is unable/unwilling to pursue genetic testing at this time. We reviewed the implications of interpreting a negative genetic test results in an unaffected person. Varinder expressed understanding and the desire to move forward with genetic testing for himself.  Based on his family history, Varinder meets current National Comprehensive Cancer Network (NCCN) criteria for genetic testing of high-penetrance breast cancer susceptibility genes (including BRCA1, BRCA2, CDH1, PALB2, PTEN, STK11 and TP53).    We discussed that there are additional genes that could cause increased risk for breast and related cancers. As many of these genes present with overlapping features in a family and accurate cancer risk cannot always be established based upon the pedigree analysis alone, it would be reasonable for Varinder to consider panel genetic testing to analyze multiple genes at once.  We reviewed genetic testing options given Varinder's family history including targeted and expanded options. After our discussion, Varinder opted to proceed with genetic testing via BRCA1/2 analysis with automatic reflex to the CancerNext panel through [employer].   Genetic testing is available for 34 genes associated with hereditary cancer: CancerNext (APC, DINO, AXIN2, BARD1, BMPR1A, BRCA1, BRCA2, BRIP1, CDH1, CDK4, CDKN2A, CHEK2, DICER1, EPCAM, GREM1, HOXB13, MLH1, MSH2, MSH3, MSH6, MUTYH, NF1, NTHL1, PALB2, PMS2, POLD1, POLE, PTEN, RAD51C, RAD51D, SMAD4, SMARCA4, STK11, and TP53).  We discussed that many of the genes in the CancerNext panel are associated with specific hereditary cancer syndromes and published management guidelines: Hereditary Breast and Ovarian Cancer syndrome (BRCA1, BRCA2), Dong syndrome (MLH1, MSH2, MSH6, PMS2, EPCAM),  Familial Adenomatous Polyposis (APC), Hereditary Diffuse Gastric Cancer (CDH1), Familial Atypical Multiple Mole Melanoma syndrome (CDK4, CDKN2A), Juvenile Polyposis syndrome (BMPR1A, SMAD4), Cowden syndrome (PTEN), Li Fraumeni syndrome (TP53), Peutz-Jeghers syndrome (STK11), MUTYH Associated Polyposis (MUTYH), and Neurofibromatosis type 1 (NF1).   The DINO, AXIN2, BRIP1, CHEK2, GREM1, MSH3, NTHL1, PALB2, POLD1, POLE, RAD51C, and RAD51D genes are associated with increased cancer risk and have published management guidelines for certain cancers.    The remaining genes (BARD1, DICER1, HOXB13, and SMARCA4) are associated with increased cancer risk and may allow us to make medical recommendations when mutations are identified.    Medical Management: For Varinder, we reviewed that the information from genetic testing may determine:  additional cancer screening for which Varinder may qualify (i.e. mammogram and breast MRI, more frequent colonoscopies, more frequent dermatologic exams, etc.),  options for risk reducing surgeries Varinder could consider (i.e. bilateral risk reducing mastectomy, surgery to remove ovaries and/or uterus, etc.),    and targeted chemotherapies for Varinder's if he were to develop certain cancers in the future (i.e. immunotherapy for individuals with Dong syndrome, PARP inhibitors, etc.).   Verbal consent obtained over video today with no witness required. A copy of the lab's consent form will be sent to Varinder via i-drive for review.   These recommendations will be discussed in detail once genetic testing is completed.   Varinder opted to have a salivia kit sent to his home to complete the genetic testing. A kit was ordered from [employer] and a saliva collection kit will be sent to the address provided during our visit. We discussed some of the limitations of completing genetic testing via saliva and Varinder was instructed to follow the instruction provided in the kit to ensure the best possibility of success for  saliva genetic testing.     Plan:  1) Today Varinder elected to proceed with BRCA1/2 analysis with automatic reflex to CancerNext panel genetic testing (34 genes) through [facility].  2) This information should be available in approximately 3-4 weeks from the time chin lab receives his sample.  3) Varinder will be contacted by our scheduling department to set up a result disclosure appointment.     Radha Perez MS, [facility]  Licensed, Certified Genetic Counselor  [facility]  [email or web address]

## 2024-05-09 NOTE — PROGRESS NOTES
Virtual Visit Details    Type of service:  Video Visit   Video Start Time:  9:01am  Video End Time: 10:00am  Originating Location (pt. Location): Home  Distant Location (provider location):  Off-site  Platform used for Video Visit: Hellen    5/9/2024    Referring Provider: Josi Pate DO    Present for Today's Visit: Varinder    Presenting Information:   I met with Varinder Momin today for genetic counseling (video visit due to covid19) to discuss his family history of cancer.  He is here today to review this history, cancer screening recommendations, and available genetic testing options.    Personal History:  Varinder is a 34 year old adult. He does not have any personal history of cancer. He was assigned female at birth and is considering top surgery in the future. He just started testosterone about 5 days ago. He's seeing his doctor tomorrow for some sleep issues and gastrointestinal issues. He reports an abnormal colonoscopy about a year and a half ago (HPV+).       He had his first menstrual period at age 12, he does not have children, and is premenopausal at this time. Varinder has ovaries, fallopian tubes and uterus in place.  He reports past history of oral contraceptive use for 2-3 years and that he has never been on hormone replacement therapy.      Has not yet started mammogram or colonoscopy screening given his age. He does not regularly do any other cancer screening at this time.  Varinder reported past tobacco use (quit in 2016; 4-5 year history), and 1-2 drinks per month for alcohol use. He reports significant secondhand smoke exposure in childhood up until about age 9 or 10 and current secondhand smoke exposure in the winter. When asked about occupational exposures, he shared that he works with [employer].     Family History: Cancer family history and pertinent information reviewed below (Please see scanned pedigree for detailed family history information)  Mother with a history of breast cancer diagnosed at age  50; treatment included lumpectomy, radiation, and tamoxifen. She also underwent ANTON-BSO.   Maternal grandmother with a history of breast cancer diagnosed in her 70's.   Paternal aunt passed in her 60's from a throat cancer. She did have smoking history.   There is no known Ashkenazi Rastafari ancestry on either side of his family. There is no reported consanguinity.    Discussion:  Varinder's family history of cancer is suggestive of a hereditary cancer syndrome.  We reviewed the features of sporadic, familial, and hereditary cancers. In looking at Varinder's family history, it is possible that a cancer susceptibility gene is present due to his mother's early-onset breast cancer and his maternal grandmother's breast cancer history.  We discussed the natural history and genetics of hereditary cancers. A detailed handout regarding the information we discussed will be sent to Varinder in [place] mail. Topics included: inheritance pattern, cancer risks, cancer screening recommendations, and also risks, benefits and limitations of testing.  We reviewed that the most common cause of hereditary breast cancer is Hereditary Breast and Ovarian Cancer (HBOC) syndrome, which is caused by mutations in the genes BRCA1 and BRCA2. Those assigned female at birth with a mutation in either of these genes are at increased risk for female breast and ovarian cancer. There is also an increased risk for a second primary breast cancer. Those assigned male at birth with a mutation in either BRCA1 or BRCA2 are at increased risk for male breast and prostate cancer. Both may also be at increased risk for pancreatic cancer and melanoma.  We also discussed that it is always most informative to test the people in the family who have had the cancers concerning for a hereditary component or the people most closely related to those individuals. For Varinder's family, this would be his mother. Unfortunately, Varinder's mother is unable/unwilling to pursue genetic testing at this  time. We reviewed the implications of interpreting a negative genetic test results in an unaffected person. Varinder expressed understanding and the desire to move forward with genetic testing for himself.  Based on his family history, Varinder meets current National Comprehensive Cancer Network (NCCN) criteria for genetic testing of high-penetrance breast cancer susceptibility genes (including BRCA1, BRCA2, CDH1, PALB2, PTEN, STK11 and TP53).    We discussed that there are additional genes that could cause increased risk for breast and related cancers. As many of these genes present with overlapping features in a family and accurate cancer risk cannot always be established based upon the pedigree analysis alone, it would be reasonable for Varinder to consider panel genetic testing to analyze multiple genes at once.  We reviewed genetic testing options given Varinder's family history including targeted and expanded options. After our discussion, Varinder opted to proceed with genetic testing via BRCA1/2 analysis with automatic reflex to the CancerNext panel through [employer].   Genetic testing is available for 34 genes associated with hereditary cancer: CancerNext (APC, DINO, AXIN2, BARD1, BMPR1A, BRCA1, BRCA2, BRIP1, CDH1, CDK4, CDKN2A, CHEK2, DICER1, EPCAM, GREM1, HOXB13, MLH1, MSH2, MSH3, MSH6, MUTYH, NF1, NTHL1, PALB2, PMS2, POLD1, POLE, PTEN, RAD51C, RAD51D, SMAD4, SMARCA4, STK11, and TP53).  We discussed that many of the genes in the CancerNext panel are associated with specific hereditary cancer syndromes and published management guidelines: Hereditary Breast and Ovarian Cancer syndrome (BRCA1, BRCA2), Dong syndrome (MLH1, MSH2, MSH6, PMS2, EPCAM), Familial Adenomatous Polyposis (APC), Hereditary Diffuse Gastric Cancer (CDH1), Familial Atypical Multiple Mole Melanoma syndrome (CDK4, CDKN2A), Juvenile Polyposis syndrome (BMPR1A, SMAD4), Cowden syndrome (PTEN), Li Fraumeni syndrome (TP53), Peutz-Jeghers syndrome (STK11),  MUTYH Associated Polyposis (MUTYH), and Neurofibromatosis type 1 (NF1).   The DINO, AXIN2, BRIP1, CHEK2, GREM1, MSH3, NTHL1, PALB2, POLD1, POLE, RAD51C, and RAD51D genes are associated with increased cancer risk and have published management guidelines for certain cancers.    The remaining genes (BARD1, DICER1, HOXB13, and SMARCA4) are associated with increased cancer risk and may allow us to make medical recommendations when mutations are identified.    Medical Management: For Varinder, we reviewed that the information from genetic testing may determine:  additional cancer screening for which Varinder may qualify (i.e. mammogram and breast MRI, more frequent colonoscopies, more frequent dermatologic exams, etc.),  options for risk reducing surgeries Varinder could consider (i.e. bilateral risk reducing mastectomy, surgery to remove ovaries and/or uterus, etc.),    and targeted chemotherapies for Varinder's if he were to develop certain cancers in the future (i.e. immunotherapy for individuals with Dong syndrome, PARP inhibitors, etc.).   Verbal consent obtained over video today with no witness required. A copy of the lab's consent form will be sent to Varinder via [employer] for review.   These recommendations will be discussed in detail once genetic testing is completed.   Varinder opted to have a salivia kit sent to his home to complete the genetic testing. A kit was ordered from [employer] and a saliva collection kit will be sent to the address provided during our visit. We discussed some of the limitations of completing genetic testing via saliva and Varinder was instructed to follow the instruction provided in the kit to ensure the best possibility of success for saliva genetic testing.     Plan:  1) Today Varinder elected to proceed with BRCA1/2 analysis with automatic reflex to CancerNext panel genetic testing (34 genes) through [employer].  2) This information should be available in approximately 3-4 weeks from the time chin lab  receives his sample.  3) Varinder will be contacted by our scheduling department to set up a result disclosure appointment.     Radha Perez MS, [facility]  Licensed, Certified Genetic Counselor  [facility]  [email or web address]

## 2024-05-09 NOTE — PATIENT INSTRUCTIONS
Assessing Cancer Risk  Cancer is a common diagnosis which impacts many families.  Individuals may develop cancer due to environmental factors (such as exposures and lifestyle), aging, genetic predisposition, or a combination of these factors.      Only about 5-10% of cancers are thought to be due to an inherited cancer susceptibility gene.    These families often have:  Several people with the same or related types of cancer  Cancers diagnosed at a young age (before age 50)  Individuals with more than one primary cancer  Multiple generations of the family affected with cancer    Comprehensive Breast and Gynecologic Cancer Panel  We each inherit two copies of every gene in our bodies: one from our mother, and one from our father. Each gene has a specific job to do.  When a gene has a mistake or  mutation  in it, it does not work like it should.     Some people may be candidates for genetic testing of more than one gene.  For these families, genetic testing using a cancer panel may be offered. These panels will test different genes at once known to increase the risk for breast, ovarian, uterine, and/or other cancers.    This handout will review common hereditary breast and gynecologic cancer syndromes. The genes that will be discussed in this handout are: DINO, BRCA1, BRCA2, BRIP1, CDH1, CHEK2, MLH1, MSH2, MSH6, PMS2, EPCAM, PTEN, PALB2, RAD51C, RAD51D, and TP53.    The purpose of this handout is to serve as a brief summary of the breast and gynecologic cancer risk genes that have published clinical management guidelines for individuals who are found to carry a mutation. Inheriting a mutation does not mean a person will develop cancer, but it does significantly increase their risk above the general population risk.     ______________________________________________________________________________    Hereditary Breast and Ovarian Cancer Syndrome (BRCA1 and BRCA2)  A single mutation in one of the copies of BRCA1 or  BRCA2 increases the risk for breast and ovarian cancer, among others.  The risk for pancreatic cancer and melanoma may also be slightly increased in some families.  The chart below shows the chance that someone with a BRCA mutation would develop cancer in his or her lifetime1,2,3,4.       Lifetime Cancer Risks    General Population BRCA1  BRCA2   Breast  12% >60% >60%   Ovarian  1-2% 39-58% 13-29%   Prostate 12% 7-26% 19-61%   Male Breast 0.1% 0.2-1.2% 1.8-7.1%   Pancreas 1-2% Up to 5% 5-10%     A person s ethnic background is also important to consider, as individuals of Ashkenazi Vood[place] ancestry have a higher chance of having a BRCA gene mutation.  There are three BRCA mutations that occur more frequently in this population.      Dong Syndrome (MLH1, MSH2, MSH6, PMS2, and EPCAM)  Currently five genes are known to cause Dong Syndrome: MLH1, MSH2, MSH6, PMS2, and EPCAM.  A single mutation in one of the Dong Syndrome genes increases the risk for colon, endometrial, ovarian, and stomach cancers.  Other cancers that occur less commonly in Dong Syndrome include urinary tract, skin, and brain cancers.  The chart below shows the chance that a person with Dong syndrome would develop cancer in his or her lifetime5.      Lifetime Cancer Risks    General Population Dong Syndrome   Colon 5% 10-61%   Endometrial 3% 13-57%   Ovarian 1-2% 1-38%   Stomach <1% 1-9%   *Cancer risk varies depending on Dong syndrome gene found      Cowden Syndrome (PTEN)  Cowden syndrome is a hereditary condition that increases the risk for breast, thyroid, endometrial, colon, and kidney cancer.  Cowden syndrome is caused by a mutation in the PTEN gene.  A single mutation in one of the copies of PTEN causes Cowden syndrome and increases cancer risk.  The chart below shows the chance that someone with a PTEN mutation would develop cancer in their lifetime6,7.  Other benign features seen in some individuals with Cowden syndrome include benign  skin lesions (facial papules, keratoses, lipomas), learning disability, autism, thyroid nodules, colon polyps, and larger head size.     Lifetime Cancer Risks    General Population Cowden   Breast 12% 40-60%*   Thyroid 1% Up to 38%   Renal 1-2% Up to 35%   Endometrial 3% Up to 28%   Colon 5% Up to 9%   Melanoma 2-3% Up to 6%   *Emerging data suggests the risk for breast cancer could be greater than 60%               Li-Fraumeni Syndrome (TP53)  Li-Fraumeni Syndrome (LFS) is a cancer predisposition syndrome caused by a mutation in the TP53 gene. A single mutation in one of the copies of TP53 increases the risk for multiple cancers. Individuals with LFS are at an increased risk for developing cancer at a young age. The lifetime risk for development of a LFS-associated cancer is 50% by age 30 and 90% by age 60.   Core Cancers: Sarcomas, Breast, Brain, Lung, Leukemias/Lymphomas, Adrenocortical carcinomas  Other Cancers: Gastrointestinal, Thyroid, Skin, Genitourinary       Hereditary Diffuse Gastric Cancer (CDH1)  Currently, one gene is known to cause hereditary diffuse gastric cancer (HDGC): CDH1.  Individuals with HDGC are at increased risk for diffuse gastric cancer and lobular breast cancer. Of people diagnosed with HDGC, 30-50% have a mutation in the CDH1 gene.  This suggests there are likely other genes that may cause HDGC that have not been identified yet.      Lifetime Cancer Risks    General Population HDGC   Diffuse Gastric  <1% ~80%   Breast 12% 41-60%       Additional Genes    DINO  DINO is a moderate-risk breast cancer gene. Women who have a mutation in DINO can have between a 2-4 fold increased risk for breast cancer compared to the general population8. DINO mutations have also been associated with increased risk for pancreatic cancer between 5-10%9. Individuals who inherit two DINO mutations have a condition called ataxia-telangiectasia (AT).  This rare autosomal recessive condition affects the nervous system  and immune system, and is associated with progressive cerebellar ataxia beginning in childhood. Individuals with ataxia-telangiectasia often have a weakened immune system and have an increased risk for childhood cancers.    PALB2  Mutations in PALB2 have been shown to increase the risk of breast cancer up to 41-60% in some families; where individuals fall within this risk range is dependent upon family [id number]. PALB2 mutations have also been associated with increased risk for pancreatic cancer between 5-10%.  Individuals who inherit two PALB2 mutations--one from their mother and one from their father--have a condition called Fanconi Anemia.  This rare autosomal recessive condition is associated with short stature, developmental delay, bone marrow failure, and increased risk for childhood cancers.    CHEK2   CHEK2 is a moderate-risk breast cancer gene.  Women who have a mutation in CHEK2 have around a 2-4 fold increased risk for breast cancer compared to the general population, and this risk may be higher depending upon family history.11,12,13 The risk of colon cancer may be twice as high as the general population risk of colon cancer of 5%. Mutations in CHEK2 have also been shown to increase the risk of other cancers, including prostate, however these cancer risks are currently not well understood.    BRIP1, RAD51C and RAD51D  Mutations in RAD51C and RAD51D have been shown to increase the risk of ovarian cancer and breast cancer 14,. Mutations in BRIP1 have been shown to increase the risk of ovarian cancer and possibly female breast cancer 15 .       Lifetime Cancer Risk    General Population        BRIP1   RAD51C  RAD51D   Breast 12% Not well defined 20-40% 20-40%   Ovarian 1-2% 5-15% 10-15% 10-20%     ______________________________________________________________  Inheritance  All of the cancer syndromes reviewed above are inherited in an autosomal dominant pattern.  This means that if a parent has a mutation,  each of their children will have a 50% chance of inheriting that same mutation. Therefore, each child --male or female-- would have a 50% chance of being at increased risk for developing cancer.    Image obtained from Genetics Home Reference, 2013     Mutations in some genes can occur de wallace, which means that a person s mutation occurred for the first time in them and was not inherited from a parent.  Now that they have the mutation, however, it can be passed on to future generations.    Genetic Testing  Genetic testing involves a blood test and will look for any harmful mutations that are associated with increased cancer risk.  If possible, it is recommended that the person(s) who has had cancer be tested before other family members.  That person will give us the most useful information about whether or not a specific gene is associated with the cancer in the family.    Results  There are three possible results of genetic testing:  Positive--a harmful mutation was identified in one or more of the genes  Negative--no mutations were identified in any of the genes tested  Variant of unknown significance--a variation in one of the genes was identified, but it is unclear how this impacts cancer risk in the family    Advantages and Disadvantages   There are advantages and disadvantages to genetic testing.    Advantages  May clarify your cancer risk  Can help you make medical decisions  May explain the cancers in your family  May give useful information to your family members (if you share your results)    Disadvantages  Possible negative emotional impact of learning about inherited cancer risk  Uncertainty in interpreting a negative test result in some situations  Possible genetic discrimination concerns (see below)    Genetic Information Nondiscrimination Act (LASHONDA)  The Genetic Information Nondiscrimination Act of 2008 (LASHONDA) is a federal law that protects individuals from health insurance or employment discrimination  based on a genetic test result alone (with some exceptions, including employers with fewer than 15 employees, and ).  Although rare, LASHONDA  does not cover discrimination protections in terms of life insurance, long term care, or disability insurances.  Visit the [employer] [place] website to learn more.    Reducing Cancer Risk  All of the genes described in this handout have nationally recognized cancer screening guidelines that would be recommended for individuals who test positive.  In addition to increased cancer screening, surgeries may be offered or recommended to reduce cancer risk.  Recommendations are based upon an individual s genetic test result as well as their personal and family history of cancer.    Questions to Think About Regarding Genetic Testing:  What effect will the test result have on me and my relationship with my family members if I have an inherited gene mutation?  If I don t have a gene mutation?  Should I share my test results, and how will my family react to this news, which may also affect them?  Are my children ready to learn new information that may one day affect their own health?    Hereditary Cancer Resources    FORCE: Facing Our Risk of Cancer Empowered [email or web address]   Bright Pink bebrightpink.org   Li-Fraumeni Syndrome Association lfsassociation.org   PTEN World PTENworld.com   No stomach for cancer, Inc. nostomachforcancer.org   Stomach cancer relief network Scrnet.org   Collaborative Group of the Americas on Inherited Colorectal Cancer (CGA) cgaicc.com    Cancer Care cancercare.org   American Cancer Society (ACS) cancer.org   National Cancer [place] (NCI) cancer.gov     Please call us if you have any questions or concerns.   Cancer Risk Management Program [phone] ([phone])    References  Shaina Campos PDP, Shayne S, Elian JORDAN, Reena TOLEDO, Reilly MORALES, Odalys N, Robel H, Claudette O, Raymond A, Grisel B, Karyn P, Aron S,  Kin DM, Abiodun N, Celena E, Dax H, Henrik E, Page J, Francia J, Bharat B, Ryan H, Shruthici S, Eecorinaa H, Elena H, Jose Armando K, Benny OP. Average risks of breast and ovarian cancer associated with BRCA1 or BRCA2 mutations detected in case series unselected for family history: a combined analysis of 222 studies. Am J Hum Ema. 2003;72:1117-30.  Mel N, Vale GILL, Chu G.  BRCA1 and BRCA2 Hereditary Breast and Ovarian Cancer. Gene Reviews online. 2013.  Kalyan YC, Sintia S, Jona G, Christiansen S. Breast cancer risk among male BRCA1 and BRCA2 mutation carriers. J Natl Cancer Inst. 2007;99:1811-4.  Conner REYES, Coretta I, Taras J, Mckenna E, Carlin ER, Irina F. Risk of breast cancer in male BRCA2 carriers. J Med Ema. 2010;47:710-1.  National Comprehensive Cancer Network. Clinical practice guidelines in oncology, colorectal cancer screening. Available online (registration required). 2015.  Ryan MH, Alana J, Benson J, Arash LA, Orlomaria de jesus MS, Eng C. Lifetime cancer risks in individuals with germline PTEN mutations. Clin Cancer Res. 2012;18:400-7.  Dulce PARADA. Cowden Syndrome: A Critical Review of the Clinical Literature. J Ema . 2009:18:13-27.  Horace A, Amado D, Emerald S, Catrachita P, Chagtai T, Erin M, Sebas B, Tia H, Lexi R, Samra K, Lesa L, Conner REYES, Kin BUTCHER, Viktor DF, Kellen MR, The Breast Cancer Susceptibility Collaboration (UK) & Kelsie N. DINO mutations that cause ataxia-telangiectasia are breast cancer susceptibility alleles. Nature Genetics. 2006;38:873-875  Rodger LOWE , Glenroy Y, Yoselin VALENZUELA, Ervin PALACIOS, Chinyere GM , Radha ML, Gonzaloinger S, Hinojosa AG, Syngal S, Warner ML, Darron J , Angela R, Kitty SZ, Charly JR, Nasreen VE, Unruly M, Vogelstein B, Gunnar N, Amado RH, Kelly KW, and Hardik AP. DINO mutations in patients with hereditary pancreatic cancer. Cancer Discover. 2012;2:41-46  Sara ALLRED et al. Breast-Cancer Risk in Families with  Mutations in PALB2. NEJM. 2014; 371(6):497-506.  CHEK2 Breast Cancer Case-Control Consortium. CHEK2*1100delC and susceptibility to breast cancer: A collaborative analysis involving 10,860 breast cancer cases and 9,065 controls from 10 studies. Am J Hum Ema, 74 (2004), pp. 8023-8666  Nakia T, Fabby S, Sheila K, et al. Spectrum of Mutations in BRCA1, BRCA2, CHEK2, and TP53 in Families at High Risk of Breast Cancer. JUANCARLOS. 2006;295(12):9085-4236.   Jeanine C, Sissy D, Sonya A, et al. Risk of breast cancer in women with a CHEK2 mutation with and without a family history of breast cancer. J Clin Oncol. 2011;29:4770-6244.  Clovis H, Dhiraj E, Zully SJ, et al. Contribution of germline mutations in the RAD51B, RAD51C, and RAD51D genes to ovarian cancer in the population. J Clin Oncol. 2015;33(26):7470-7424. Doi:10.1200/JCO.2015.61.2408.  Lucero T, Rene DF, Lisseth P, et al. Mutations in BRIP1 confer high risk of ovarian cancer. Abigail Ema. 2011;43(11):5757-7390. doi:10.1038/ng.955.

## 2024-05-09 NOTE — LETTER
5/9/2024         RE: Varinder Momin  [address]  [address]  [place] [address]        Dear Colleague,    Thank you for referring your patient, Varinder Momin, to the [facility]. Please see a copy of my visit note below.    Virtual Visit Details    Type of service:  Video Visit   Video Start Time:  9:01am  Video End Time: 10:00am  Originating Location (pt. Location): Home  Distant Location (provider location):  Off-site  Platform used for Video Visit: [employer]ing[employer]    5/9/2024    Referring Provider: Josi Pate DO    Present for Today's Visit: Varinder    Presenting Information:   I met with Varinder Momin today for genetic counseling (video visit due to covid19) to discuss his family history of cancer.  He is here today to review this history, cancer screening recommendations, and available genetic testing options.    Personal History:  Varinder is a 34 year old adult. He does not have any personal history of cancer. He was assigned female at birth and is considering top surgery in the future. He just started testosterone about 5 days ago. He's seeing his doctor tomorrow for some sleep issues and gastrointestinal issues. He reports an abnormal colonoscopy about a year and a half ago (HPV+).       He had his first menstrual period at age 12, he does not have children, and is premenopausal at this time. Varinder has ovaries, fallopian tubes and uterus in place.  He reports past history of oral contraceptive use for 2-3 years and that he has never been on hormone replacement therapy.      Has not yet started mammogram or colonoscopy screening given his age. He does not regularly do any other cancer screening at this time.  Varinder reported past tobacco use (quit in 2016; 4-5 year history), and 1-2 drinks per month for alcohol use. He reports significant secondhand smoke exposure in childhood up until about age 9 or 10 and current secondhand smoke exposure in the winter. When asked about  occupational exposures, he shared that he works with [employer].     Family History: Cancer family history and pertinent information reviewed below (Please see scanned pedigree for detailed family history information)  Mother with a history of breast cancer diagnosed at age 50; treatment included lumpectomy, radiation, and tamoxifen. She also underwent ANTON-BSO.   Maternal grandmother with a history of breast cancer diagnosed in her 70's.   Paternal aunt passed in her 60's from a throat cancer. She did have smoking history.   There is no known Ashkenazi Religious ancestry on either side of his family. There is no reported consanguinity.    Discussion:  Varinder's family history of cancer is suggestive of a hereditary cancer syndrome.  We reviewed the features of sporadic, familial, and hereditary cancers. In looking at Varinder's family history, it is possible that a cancer susceptibility gene is present due to his mother's early-onset breast cancer and his maternal grandmother's breast cancer history.  We discussed the natural history and genetics of hereditary cancers. A detailed handout regarding the information we discussed will be sent to Varinder in [place] mail. Topics included: inheritance pattern, cancer risks, cancer screening recommendations, and also risks, benefits and limitations of testing.  We reviewed that the most common cause of hereditary breast cancer is Hereditary Breast and Ovarian Cancer (HBOC) syndrome, which is caused by mutations in the genes BRCA1 and BRCA2. Those assigned female at birth with a mutation in either of these genes are at increased risk for female breast and ovarian cancer. There is also an increased risk for a second primary breast cancer. Those assigned male at birth with a mutation in either BRCA1 or BRCA2 are at increased risk for male breast and prostate cancer. Both may also be at increased risk for pancreatic cancer and melanoma.  Based on his family history, Varinder meets current National  Comprehensive Cancer Network (NCCN) criteria for genetic testing of high-penetrance breast cancer susceptibility genes (including BRCA1, BRCA2, CDH1, PALB2, PTEN, STK11 and TP53).    We discussed that there are additional genes that could cause increased risk for breast and related cancers. As many of these genes present with overlapping features in a family and accurate cancer risk cannot always be established based upon the pedigree analysis alone, it would be reasonable for Varinder to consider panel genetic testing to analyze multiple genes at once.  We reviewed genetic testing options given Varinder's family history including targeted and expanded options. After our discussion, Varinder opted to proceed with genetic testing via BRCA1/2 analysis with automatic reflex to the CancerNext panel through [facility].   Genetic testing is available for 34 genes associated with hereditary cancer: CancerNext (APC, DINO, AXIN2, BARD1, BMPR1A, BRCA1, BRCA2, BRIP1, CDH1, CDK4, CDKN2A, CHEK2, DICER1, EPCAM, GREM1, HOXB13, MLH1, MSH2, MSH3, MSH6, MUTYH, NF1, NTHL1, PALB2, PMS2, POLD1, POLE, PTEN, RAD51C, RAD51D, SMAD4, SMARCA4, STK11, and TP53).  We discussed that many of the genes in the CancerNext panel are associated with specific hereditary cancer syndromes and published management guidelines: Hereditary Breast and Ovarian Cancer syndrome (BRCA1, BRCA2), Dong syndrome (MLH1, MSH2, MSH6, PMS2, EPCAM), Familial Adenomatous Polyposis (APC), Hereditary Diffuse Gastric Cancer (CDH1), Familial Atypical Multiple Mole Melanoma syndrome (CDK4, CDKN2A), Juvenile Polyposis syndrome (BMPR1A, SMAD4), Cowden syndrome (PTEN), Li Fraumeni syndrome (TP53), Peutz-Jeghers syndrome (STK11), MUTYH Associated Polyposis (MUTYH), and Neurofibromatosis type 1 (NF1).   The DINO, AXIN2, BRIP1, CHEK2, GREM1, MSH3, NTHL1, PALB2, POLD1, POLE, RAD51C, and RAD51D genes are associated with increased cancer risk and have published management guidelines for certain  cancers.    The remaining genes (BARD1, DICER1, HOXB13, and SMARCA4) are associated with increased cancer risk and may allow us to make medical recommendations when mutations are identified.    Medical Management: For Varinder, we reviewed that the information from genetic testing may determine:  additional cancer screening for which Varinder may qualify (i.e. mammogram and breast MRI, more frequent colonoscopies, more frequent dermatologic exams, etc.),  options for risk reducing surgeries Varinder could consider (i.e. bilateral risk reducing mastectomy, surgery to remove ovaries and/or uterus, etc.),    and targeted chemotherapies for Varinder's if he were to develop certain cancers in the future (i.e. immunotherapy for individuals with Dong syndrome, PARP inhibitors, etc.).   Verbal consent obtained over video today with no witness required. A copy of the lab's consent form will be sent to Varinder via [employer] for review.   These recommendations will be discussed in detail once genetic testing is completed.   Varinder opted to have a salivia kit sent to his home to complete the genetic testing. A kit was ordered from [facility] and a saliva collection kit will be sent to the address provided during our visit. We discussed some of the limitations of completing genetic testing via saliva and Varinder was instructed to follow the instruction provided in the kit to ensure the best possibility of success for saliva genetic testing.     Plan:  1) Today Varinder elected to proceed with BRCA1/2 analysis with automatic reflex to CancerNext panel genetic testing (34 genes) through [facility].  2) This information should be available in approximately 3-4 weeks from the time chin lab receives his sample.  3) Varinder will be contacted by our scheduling department to set up a result disclosure appointment.     Radha Perez MS, CGC  Licensed, Certified Genetic Counselor  [facility]  [email or web address]            Again, thank  you for allowing me to participate in the care of your patient.        Sincerely,        Radha Barnett, GC

## 2024-05-09 NOTE — NURSING NOTE
Is the patient currently in the state of [place]? YES    Visit mode:VIDEO    If the visit is dropped, the patient can be reconnected by: VIDEO VISIT: Text to cell phone:   Telephone Information:   Mobile [phone]       Will anyone else be joining the visit? NO  (If patient encounters technical issues they should call [phone]    How would you like to obtain your AVS? MyChart    Are changes needed to the allergy or medication list? N/A    Reason for visit: Consult      Harmony PERALTA

## 2024-06-09 ENCOUNTER — HOSPITAL ENCOUNTER (EMERGENCY)
Facility: HOSPITAL | Age: 34
Discharge: HOME | End: 2024-06-09
Admitting: STUDENT IN AN ORGANIZED HEALTH CARE EDUCATION/TRAINING PROGRAM
Payer: COMMERCIAL

## 2024-06-09 VITALS
SYSTOLIC BLOOD PRESSURE: 138 MMHG | OXYGEN SATURATION: 100 % | BODY MASS INDEX: 23.99 KG/M2 | TEMPERATURE: 98.9 F | WEIGHT: 158.3 LBS | RESPIRATION RATE: 15 BRPM | HEIGHT: 68 IN | HEART RATE: 70 BPM | DIASTOLIC BLOOD PRESSURE: 72 MMHG

## 2024-06-09 DIAGNOSIS — Z75.8 DOES NOT HAVE PRIMARY CARE PROVIDER: ICD-10-CM

## 2024-06-09 DIAGNOSIS — N39.0 ACUTE UTI: ICD-10-CM

## 2024-06-09 LAB
ALBUMIN UR-MCNC: 20 MG/DL
APPEARANCE UR: ABNORMAL
BACTERIA #/AREA URNS HPF: ABNORMAL /HPF
BILIRUB UR QL STRIP: NEGATIVE
COLOR UR AUTO: YELLOW
GLUCOSE UR STRIP-MCNC: NEGATIVE MG/DL
HCG UR QL: NEGATIVE
HGB UR QL STRIP: ABNORMAL
KETONES UR STRIP-MCNC: NEGATIVE MG/DL
LEUKOCYTE ESTERASE UR QL STRIP: ABNORMAL
MUCOUS THREADS #/AREA URNS LPF: PRESENT /LPF
NITRATE UR QL: NEGATIVE
PH UR STRIP: 5.5 [PH] (ref 5–7)
RBC URINE: 85 /HPF
SP GR UR STRIP: 1.03 (ref 1–1.03)
SQUAMOUS EPITHELIAL: 1 /HPF
UROBILINOGEN UR STRIP-MCNC: <2 MG/DL
WBC URINE: >182 /HPF

## 2024-06-09 PROCEDURE — 99284 EMERGENCY DEPT VISIT MOD MDM: CPT

## 2024-06-09 PROCEDURE — 87186 SC STD MICRODIL/AGAR DIL: CPT | Performed by: STUDENT IN AN ORGANIZED HEALTH CARE EDUCATION/TRAINING PROGRAM

## 2024-06-09 PROCEDURE — 81001 URINALYSIS AUTO W/SCOPE: CPT | Performed by: STUDENT IN AN ORGANIZED HEALTH CARE EDUCATION/TRAINING PROGRAM

## 2024-06-09 PROCEDURE — 87086 URINE CULTURE/COLONY COUNT: CPT | Performed by: STUDENT IN AN ORGANIZED HEALTH CARE EDUCATION/TRAINING PROGRAM

## 2024-06-09 PROCEDURE — 250N000013 HC RX MED GY IP 250 OP 250 PS 637: Performed by: STUDENT IN AN ORGANIZED HEALTH CARE EDUCATION/TRAINING PROGRAM

## 2024-06-09 PROCEDURE — 81025 URINE PREGNANCY TEST: CPT | Performed by: STUDENT IN AN ORGANIZED HEALTH CARE EDUCATION/TRAINING PROGRAM

## 2024-06-09 RX ORDER — CEPHALEXIN 500 MG/1
500 CAPSULE ORAL 2 TIMES DAILY
Qty: 10 CAPSULE | Refills: 0 | Status: SHIPPED | OUTPATIENT
Start: 2024-06-09 | End: 2024-06-14

## 2024-06-09 RX ORDER — CEPHALEXIN 500 MG/1
500 CAPSULE ORAL ONCE
Status: COMPLETED | OUTPATIENT
Start: 2024-06-09 | End: 2024-06-09

## 2024-06-09 RX ORDER — PHENAZOPYRIDINE HYDROCHLORIDE 200 MG/1
200 TABLET, FILM COATED ORAL 3 TIMES DAILY PRN
Qty: 6 TABLET | Refills: 0 | Status: SHIPPED | OUTPATIENT
Start: 2024-06-09 | End: 2024-06-11

## 2024-06-09 RX ORDER — PHENAZOPYRIDINE HYDROCHLORIDE 100 MG/1
200 TABLET, FILM COATED ORAL ONCE
Status: COMPLETED | OUTPATIENT
Start: 2024-06-09 | End: 2024-06-09

## 2024-06-09 RX ADMIN — CEPHALEXIN 500 MG: 500 CAPSULE ORAL at 23:28

## 2024-06-09 RX ADMIN — PHENAZOPYRIDINE 200 MG: 100 TABLET ORAL at 23:28

## 2024-06-09 ASSESSMENT — ACTIVITIES OF DAILY LIVING (ADL): ADLS_ACUITY_SCORE: 33

## 2024-06-09 ASSESSMENT — COLUMBIA-SUICIDE SEVERITY RATING SCALE - C-SSRS
2. HAVE YOU ACTUALLY HAD ANY THOUGHTS OF KILLING YOURSELF IN THE PAST MONTH?: NO
6. HAVE YOU EVER DONE ANYTHING, STARTED TO DO ANYTHING, OR PREPARED TO DO ANYTHING TO END YOUR LIFE?: YES
1. IN THE PAST MONTH, HAVE YOU WISHED YOU WERE DEAD OR WISHED YOU COULD GO TO SLEEP AND NOT WAKE UP?: NO

## 2024-06-10 NOTE — ED TRIAGE NOTES
Patient arrives to triage with significant other with chief complaint of painful urination since last night.  Patient also reports mild odor to urine as well.  Alert and oriented x4.

## 2024-06-10 NOTE — ED PROVIDER NOTES
Emergency Department Encounter   NAME: Varinder Momin ; AGE: 34 year old adult ; YOB: 1990 ; MRN: [id number] ; PCP: [facility]   ED PROVIDER: Suzette Rm PA-C    Evaluation Date & Time:   No admission date for patient encounter.    CHIEF COMPLAINT:  Dysuria        Impression and Plan   FINAL IMPRESSION:    ICD-10-CM    1. Acute UTI  N39.0 Primary Care Referral      2. Does not have primary care provider  Z75.8 Primary Care Referral          MDM:  Varinder is a 34 year old with PMH of anxiety, tachycardia, and palpitations presenting to the emergency department for evaluation of pain with urination.  He states starting last night he started to notice some burning with urination. Upon urinating today he notes that the pain has increased. He also endorses occasional foul smell to his urine. Denies any presence of blood. No history of kidney stones. He is born female at birth and has transitioned to male, still has a uterus. Denies any changes in vaginal discharge. He states he does get a period monthly, no spotting.  Denies any abdominal pain or flank pain.  No nausea, vomiting, or diarrhea.  He states he just started testosterone last month, is currently getting testosterone from [facility].    Per chart review virtual visit from 5/9/2024 patient has ovaries, fallopian tubes, and uterus in place.     Vitals reviewed and unremarkable, temp 98.9 F. On exam he is resting comfortably, nontoxic-appearing. Differential diagnosis includes but not limited to yeast infection, BV, STI, UTI, constipation, pyelonephritis. UA shows evidence of UTI with 500 leukocyte esterase and 182 WBCs.  Urine is nitrite negative. Urine hCG negative. Patient does not have any abdominal pain, abdominal distention, or flank pain. His vitals are stable and he is afebrile. I do not feel that any further lab work or imaging is indicated at this time to evaluate for pyelonephritis. He does not have any history  of kidney stones and does not have any pain aside from burning with urination so overall low suspicion for kidney stone. He denies any concern for STI and does not have any new vaginal bleeding, pain, or spotting to indicate need for vaginal swabs or ultrasound. Previous urine culture grew E. coli susceptible to all cephalosporins. Will plan to start patient on a course of Keflex twice daily for 5 days. I have also given them some Pyridium to help with dysuria. I have placed a referral to primary care for the patient to follow-up regarding UTI as well as establish primary care as patient states that they do not currently have a primary care provider. Return precautions were provided, patient is understanding and agreeable to this plan.      History:  Supplemental history from: Documented in chart  External Record(s) reviewed: Documented in chart    Work Up:  Chart documentation includes differential considered and any EKGs or imaging independently interpreted by provider, where specified.  In additional to work up documented, I considered the following work up: CT abdomen pelvis, TVUS, wet prep    External consultation:  Discussion of management with another provider: Documented in chart, if applicable    Complicating factors:  Care impacted by chronic illness: Mental Health  Care affected by social determinants of health: N/A    Disposition considerations: Discharge. I prescribed additional prescription strength medication(s) as charted. See documentation for any additional details.      ED COURSE:  10:28 PM I met and introduced myself to the patient. I gathered initial history and performed my physical exam. We discussed plan for initial workup.    11:10 PM I rechecked the patient and discussed results, discharge, follow up, and reasons to return to the ED.     At the conclusion of the encounter I discussed the results of all the tests and the disposition. The questions were answered. The patient or family  "acknowledged understanding and was agreeable with the care plan.          MEDICATIONS GIVEN IN THE EMERGENCY DEPARTMENT:  Medications   cephALEXin (KEFLEX) capsule 500 mg (has no administration in time range)   phenazopyridine (PYRIDIUM) tablet 200 mg (has no administration in time range)         NEW PRESCRIPTIONS STARTED AT TODAY'S ED VISIT:  New Prescriptions    CEPHALEXIN (KEFLEX) 500 MG CAPSULE    Take 1 capsule (500 mg) by mouth 2 times daily for 5 days    PHENAZOPYRIDINE (PYRIDIUM) 200 MG TABLET    Take 1 tablet (200 mg) by mouth 3 times daily as needed for irritation         HPI   Patient information was obtained from: patient    Use of Intrepreter: N/A     Varinder Momin is a 34 year old adult with a pertinent history of anxiety, tachycardia, and palpitations who presents to the ED by walk in for evaluation of dysuria.     Patient reports 1 day of dysuria and malodor. He is transgender male who still has a uterus. Denies vaginal discharge or spotting. No abdominal pain, flank pain, fever, chills, nausea, vomiting, or any other complaints at this time.     Medical History     No past medical history on file.    No past surgical history on file.    Family History   Problem Relation Age of Onset    Breast Cancer Mother 50    Breast Cancer Maternal Grandmother 70       Social History     Tobacco Use    Smoking status: Never     Passive exposure: Current    Smokeless tobacco: Never   Vaping Use    Vaping status: Never Used    Passive vaping exposure: Yes       cephALEXin (KEFLEX) 500 MG capsule  phenazopyridine (PYRIDIUM) 200 MG tablet  escitalopram (LEXAPRO) 5 MG tablet  hydrOXYzine (ATARAX) 25 MG tablet  LORazepam (ATIVAN) 0.5 MG tablet  propranolol ER (INDERAL LA) 60 MG 24 hr capsule  zolpidem (AMBIEN) 5 MG tablet          Physical Exam     First Vitals:  Patient Vitals for the past 24 hrs:   BP Temp Temp src Pulse Resp SpO2 Height Weight   06/09/24 2059 136/75 98.9  F (37.2  C) Oral 73 15 99 % 1.727 m (5' 8\") " 71.8 kg (158 lb 4.8 oz)         PHYSICAL EXAM    General Appearance:  Alert, cooperative, no distress, appears stated age  Respiratory: No distress.   Cardiovascular: Regular rate   GI: Abdomen soft, nontender, normal bowel sounds  : No CVA tenderness  Musculoskeletal: Moving all extremities. No gross deformities  Integument: Warm, dry, no rashes or lesions  Neurologic: Alert and orientated x3. No focal deficits.  Psych: Normal mood and affect        Results     LAB:  All pertinent labs reviewed and interpreted  Labs Ordered and Resulted from Time of ED Arrival to Time of ED Departure   ROUTINE UA WITH MICROSCOPIC REFLEX TO CULTURE - Abnormal       Result Value    Color Urine Yellow      Appearance Urine Turbid (*)     Glucose Urine Negative      Bilirubin Urine Negative      Ketones Urine Negative      Specific Gravity Urine 1.027      Blood Urine 0.5 mg/dL (*)     pH Urine 5.5      Protein Albumin Urine 20 (*)     Urobilinogen Urine <2.0      Nitrite Urine Negative      Leukocyte Esterase Urine 500 Delphine/uL (*)     Bacteria Urine Few (*)     Mucus Urine Present (*)     RBC Urine 85 (*)     WBC Urine >182 (*)     Squamous Epithelials Urine 1     HCG QUALITATIVE URINE - Normal    hCG Urine Qualitative Negative     URINE CULTURE       RADIOLOGY:  No orders to display         ECG:  N/A      PROCEDURES:  N/A      I, Renetta Wagner, am serving as a scribe to document services personally performed by Suzette Rm PA-C, based on my observation and the provider's statements to me. ISuzette PA-C attest that Renetta Wagner is acting in a scribe capacity, has observed my performance of the services and has documented them in accordance with my direction.       Suzette Rm PA-C   Emergency Medicine   [facility] EMERGENCY DEPARTMENT       Suzette Rm PA-C  06/09/24 2783

## 2024-06-10 NOTE — DISCHARGE INSTRUCTIONS
You were seen in the emergency department today for pain with urination.  Your urinalysis today shows evidence of a urinary tract infection.    You were started on an antibiotic called Keflex, you will take 500 mg twice daily for the next 5 days.  You are also given a medication called Pyridium, you can take 200 mg up to 3 times daily for up to 2 days.  This can help with symptom relief with pain with urination.  You can also use Tylenol and ibuprofen as needed.    You may take Ibuprofen up to 400 mg by mouth every 4-6 hours as needed for pain. Do not exceed 2400 mg/day.  You may take Tylenol 325-1000 mg by mouth every 4-6 hours as needed for pain. Do not exceed 1000mg (1g) in 4 hours or 4 g/day from all sources.  You may combine Tylenol and Ibuprofen- up to 400 mg of ibuprofen and 1000 mg of Tylenol at the same time, up to 3 times a day for the pain    I have placed a primary care referral for you, they will call you to schedule follow-up.  Return to the ER if you develop any severe worsened abdominal or flank pain, high fevers, or ariel blood in your urine

## 2024-06-11 ENCOUNTER — TELEPHONE (OUTPATIENT)
Dept: NURSING | Facility: CLINIC | Age: 34
End: 2024-06-11
Payer: COMMERCIAL

## 2024-06-11 LAB — BACTERIA UR CULT: ABNORMAL

## 2024-06-11 NOTE — TELEPHONE ENCOUNTER
[facility]    Reason for call: Lab Result Notification     Lab Result (including Rx patient on, if applicable).  If culture, copy of lab report at bottom.  Lab Result: See below    ED Rx: cephALEXin (KEFLEX) 500 MG capsule - Take 1 capsule (500 mg) by mouth 2 times daily for 5 days (SUSCEPTIBLE)    Creatinine Level (mg/dl)   Creatinine   Date Value Ref Range Status   03/18/2024 0.88 0.51 - 1.17 mg/dL Final     Comment:     Male and Female  0-2 Months    0.31-0.88 mg/dL  2-12 Months   0.16-0.39 mg/dL  1-2 Years     0.18-0.35 mg/dL  3-4 Years     0.26-0.42 mg/dL  5-6 Years     0.29-0.47 mg/dL  7-8 Years     0.34-0.53 mg/dL  9-10 Years    0.33-0.64 mg/dL  11-12 Years   0.44-0.68 mg/dL  13-14 Years   0.46-0.77 mg/dL    Female  15 Years and older  0.51-0.95 mg/dL    Male  15 Years and older  0.67-1.17 mg/dL        Creatinine clearance (ml/min), if applicable    Serum creatinine: 0.88 mg/dL 03/18/24 1407  Estimated creatinine clearance: 101.1 mL/min (Female)  Estimated creatinine clearance: 119 mL/min (Male)     Patient's current Symptoms:   Pain is gone.  Denies any new or worsening symptoms.     RN Recommendations/Instructions per [place] ED lab result protocol:   [facility] ED lab result protocol utilized: Urine culture    Patient/care giver notified to contact your PCP clinic or return to the Emergency department if your:  Symptoms do not resolve after completing antibiotic.  Symptoms worsen or other concerning symptoms.    MIKI KOO RN

## 2024-07-12 DIAGNOSIS — Z80.3 FAMILY HISTORY OF MALIGNANT NEOPLASM OF BREAST: Primary | ICD-10-CM

## 2024-07-19 ENCOUNTER — LAB (OUTPATIENT)
Dept: LAB | Facility: HOSPITAL | Age: 34
End: 2024-07-19
Payer: COMMERCIAL

## 2024-07-19 DIAGNOSIS — Z80.3 FAMILY HISTORY OF MALIGNANT NEOPLASM OF BREAST: ICD-10-CM

## 2024-07-19 PROCEDURE — 36415 COLL VENOUS BLD VENIPUNCTURE: CPT

## 2024-07-30 LAB — SCANNED LAB RESULT: NORMAL

## 2024-08-09 NOTE — PROGRESS NOTES
"8/12/2024    Virtual Visit Details    Type of service:  Video Visit   Video Start Time:  1:15pm  Video End Time: 1:30pm  Originating Location (pt. Location): Home  Distant Location (provider location):  Off-site  Platform used for Video Visit: Hellen    Referring Provider:    Josi Pate DO       Presenting Information:  I spoke to Varinder by phone today to discuss his genetic testing results. His blood was drawn on 7/19/2024. CancerNext panel testing was ordered from [facility]. This testing was done because of Varinder's family history of breast cancer.    Genetic Testing Result: NEGATIVE  Varinder is negative for any pathogenic (harmful) mutations in the APC, DINO, AXIN2, BARD1, BMPR1A, BRCA1, BRCA2, BRIP1, CDH1, CDK4, CDKN2A, CHEK2, DICER1, EPCAM, GREM1, HOXB13, MLH1, MSH2, MSH3, MSH6, MUTYH, NF1, NTHL1, PALB2, PMS2, POLD1, POLE, PTEN, RAD51C, RAD51D SMAD4, SMARCA4, STK11, and TP53. No pathogenic mutations were found in any of the 34 genes analyzed. This test involved sequencing and deletion/duplication analysis of all genes with the exceptions of EPCAM and GREM1 (deletions/duplications only) and HOXB13, POLD1 and POLE (sequencing only).    Testing did not detect an identifiable mutation associated with Hereditary Breast and Ovarian Cancer syndrome (BRCA1, BRCA2), Dong syndrome (MLH1, MSH2, MSH6, PMS2, EPCAM), Familial Adenomatous Polyposis (APC), Hereditary Diffuse Gastric Cancer (CDH1), Familial Atypical Multiple Mole Melanoma syndrome (CDK4, CDKN2A), Juvenile Polyposis syndrome (BMPR1A, SMAD4), Cowden syndrome (PTEN), Li Fraumeni syndrome (TP53), Peutz-Jeghers syndrome (STK11), MUTYH Associated Polyposis (MUTYH), or Neurofibromatosis type 1 (NF1).    A copy of the test report can be found in the Laboratory tab, dated 7/19/2024, and named \"LABORATORY MISCELLANEOUS ORDER\". The report is scanned in as a linked document.    Interpretation:  We discussed several different interpretations of this negative test " result.    One explanation may be that there is a different gene or combination of genes and environment that are associated with the cancers in this family.  It is possible that his other relatives with cancer history did have a mutation in one of the above genes, and he did not inherit it.  There is also a small possibility that there is a mutation in one of these genes, and the testing laboratory could not find it with their current testing methods.       Screening:  Based on this negative test result, it is important for Varinder and his relatives to refer back to the family history for appropriate cancer screening.    We reviewed that Varinder should start mammogram screening at age 40 or 10 year before the earliest breast cancer diagnosis in the family, whichever comes first. Given Varinder's mother's diagnosis at age 50 and maternal grandmother's diagnosis in her 70's, 40 is the most appropriate age for Varinder to start screening mammograms. I explained that Varinder should reach out closer to the time he turns 39 to solidify a screening plan. Varinder shared the he is considering top surgery in the near future.   Other population cancer screening options, such as those recommended by the American Cancer Society and the National Comprehensive Cancer Network (NCCN), are also appropriate for Varinder and his family. These screening recommendations may change if there are changes to Varinder's personal and/or family history. Final screening recommendations should be made by each individual's managing physician.      Inheritance:  We reviewed the autosomal dominant inheritance of mutations in these 34 genes. We discussed that Varinder cannot/did not pass on an identifiable mutation in these genes to his children based on this test result.  Mutations in these genes do not skip generations.      Additional Testing Considerations:  Although Varinder's genetic testing result was negative, other relatives may still carry a gene mutation associated with  hereditary cancer. Genetic counseling is recommended for her mother to discuss genetic testing options. If any of these relatives do pursue genetic testing, Varinder is encouraged to contact me so that we may review the impact of their test results on him.    Summary:  We do not have an explanation for Varinder's family history of breast cancer cancer. While no genetic changes were identified, Varinder may still be at risk for certain cancers due to family history, environmental factors, or other genetic causes not identified by this test.  Because of that, it is important that he continue with cancer screening based on his personal and family history as discussed above.    Genetic testing is rapidly advancing, and new cancer susceptibility genes will most likely be identified in the future.  Therefore, I encouraged Varinder to contact me annually or if there are changes in his personal or family history.  This may change how we assess his cancer risk, screening, and the testing we would offer.    Plan:  1. A copy of the test results will be sent to Varinder.  2. He plans to follow-up with his primary care providers for routine care and cancer screening.  3. He should contact me regularly, or sooner if his family history changes.    Radha Perez MS, [facility]  Licensed, Certified Genetic Counselor  [facility]  [email or web address]

## 2024-08-12 ENCOUNTER — VIRTUAL VISIT (OUTPATIENT)
Dept: ONCOLOGY | Facility: CLINIC | Age: 34
End: 2024-08-12
Attending: GENETIC COUNSELOR, MS
Payer: COMMERCIAL

## 2024-08-12 DIAGNOSIS — Z80.3 FAMILY HISTORY OF MALIGNANT NEOPLASM OF BREAST: Primary | ICD-10-CM

## 2024-08-12 PROCEDURE — 999N000069 HC STATISTIC GENETIC COUNSELING, < 16 MIN: Mod: GT,95 | Performed by: GENETIC COUNSELOR, MS

## 2024-08-12 NOTE — NURSING NOTE
Current patient location: [address]   [place] [address]    Is the patient currently in the state of [place]? YES    Visit mode:VIDEO    If the visit is dropped, the patient can be reconnected by: VIDEO VISIT: Text to cell phone:   Telephone Information:   Mobile [phone]       Will anyone else be joining the visit? NO  (If patient encounters technical issues they should call [phone]    How would you like to obtain your AVS? MyChart    Are changes needed to the allergy or medication list? N/A    Are refills needed on medications prescribed by this physician? NO    Rooming Documentation:  Not applicable      Reason for visit: DWAIN PERALTA

## 2024-08-12 NOTE — LETTER
8/12/2024      Varinder Momin  [address]  [place] [address]      Dear Colleague,    Thank you for referring your patient, Varinder Momin, to the [facility]. Please see a copy of my visit note below.    8/12/2024    Virtual Visit Details    Type of service:  Video Visit   Video Start Time:  1:15pm  Video End Time: 1:30pm  Originating Location (pt. Location): Home  Distant Location (provider location):  Off-site  Platform used for Video Visit: Hellen    Referring Provider:    Josi Pate DO       Presenting Information:  I spoke to Varinder by phone today to discuss his genetic testing results. His blood was drawn on 7/19/2024. CancerNext panel testing was ordered from [facility]. This testing was done because of Varinder's family history of breast cancer.    Genetic Testing Result: NEGATIVE  Varinder is negative for any pathogenic (harmful) mutations in the APC, DINO, AXIN2, BARD1, BMPR1A, BRCA1, BRCA2, BRIP1, CDH1, CDK4, CDKN2A, CHEK2, DICER1, EPCAM, GREM1, HOXB13, MLH1, MSH2, MSH3, MSH6, MUTYH, NF1, NTHL1, PALB2, PMS2, POLD1, POLE, PTEN, RAD51C, RAD51D SMAD4, SMARCA4, STK11, and TP53. No pathogenic mutations were found in any of the 34 genes analyzed. This test involved sequencing and deletion/duplication analysis of all genes with the exceptions of EPCAM and GREM1 (deletions/duplications only) and HOXB13, POLD1 and POLE (sequencing only).    Testing did not detect an identifiable mutation associated with Hereditary Breast and Ovarian Cancer syndrome (BRCA1, BRCA2), Dong syndrome (MLH1, MSH2, MSH6, PMS2, EPCAM), Familial Adenomatous Polyposis (APC), Hereditary Diffuse Gastric Cancer (CDH1), Familial Atypical Multiple Mole Melanoma syndrome (CDK4, CDKN2A), Juvenile Polyposis syndrome (BMPR1A, SMAD4), Cowden syndrome (PTEN), Li Fraumeni syndrome (TP53), Peutz-Jeghers syndrome (STK11), MUTYH Associated Polyposis (MUTYH), or Neurofibromatosis type 1 (NF1).    A copy of  "the test report can be found in the Laboratory tab, dated 7/19/2024, and named \"LABORATORY MISCELLANEOUS ORDER\". The report is scanned in as a linked document.    Interpretation:  We discussed several different interpretations of this negative test result.    One explanation may be that there is a different gene or combination of genes and environment that are associated with the cancers in this family.  It is possible that his other relatives with cancer history did have a mutation in one of the above genes, and he did not inherit it.  There is also a small possibility that there is a mutation in one of these genes, and the testing laboratory could not find it with their current testing methods.       Screening:  Based on this negative test result, it is important for Varinder and his relatives to refer back to the family history for appropriate cancer screening.    We reviewed that Varinder should start mammogram screening at age 40 or 10 year before the earliest breast cancer diagnosis in the family, whichever comes first. Given Varinder's mother's diagnosis at age 50 and maternal grandmother's diagnosis in her 70's, 40 is the most appropriate age for Varinder to start screening mammograms. I explained that Varinder should reach out closer to the time he turns 39 to solidify a screening plan. Varinder shared the he is considering top surgery in the near future.   Other population cancer screening options, such as those recommended by the American Cancer Society and the National Comprehensive Cancer Network (NCCN), are also appropriate for Varinder and his family. These screening recommendations may change if there are changes to Varinder's personal and/or family history. Final screening recommendations should be made by each individual's managing physician.      Inheritance:  We reviewed the autosomal dominant inheritance of mutations in these 34 genes. We discussed that Varinder cannot/did not pass on an identifiable mutation in these genes to his " children based on this test result.  Mutations in these genes do not skip generations.      Additional Testing Considerations:  Although Varinder's genetic testing result was negative, other relatives may still carry a gene mutation associated with hereditary cancer. Genetic counseling is recommended for her mother to discuss genetic testing options. If any of these relatives do pursue genetic testing, Varinder is encouraged to contact me so that we may review the impact of their test results on him.    Summary:  We do not have an explanation for Varinder's family history of breast cancer cancer. While no genetic changes were identified, Varinder may still be at risk for certain cancers due to family history, environmental factors, or other genetic causes not identified by this test.  Because of that, it is important that he continue with cancer screening based on his personal and family history as discussed above.    Genetic testing is rapidly advancing, and new cancer susceptibility genes will most likely be identified in the future.  Therefore, I encouraged Varinder to contact me annually or if there are changes in his personal or family history.  This may change how we assess his cancer risk, screening, and the testing we would offer.    Plan:  1. A copy of the test results will be sent to Varinder.  2. He plans to follow-up with his primary care providers for routine care and cancer screening.  3. He should contact me regularly, or sooner if his family history changes.    Radha Perez MS, CGC  Licensed, Certified Genetic Counselor  [facility]  [email or web address]        Again, thank you for allowing me to participate in the care of your patient.        Sincerely,        Radha Barnett GC

## 2024-08-12 NOTE — PATIENT INSTRUCTIONS
Negative Genetic Test Result    Genetic Testing  Genetic testing involved a blood or saliva test which looked at the genetic information in select genes for variants associated with cancer risk.  This testing may have included analysis of a single gene due to a known variant in the family, multiple genes most associated with the cancers in a family, or an expanded panel of genes related to many types of cancers.     Results  There are several possible genetic test results, including:   Positive--a harmful mutation (also known as a  pathogenic  or  likely pathogenic  variant) was identified in a gene associated with increased cancer risk.  These risks, as well as medical management options, depend on the specific genetic variant identified.    Negative--no variants were identified in the genes analyzed   Variant of unknown significance--a variant was identified in one or more genes, though it is currently unclear how this impacts cancer risk in the family.  Genetic testing labs are working to collect evidence about these uncertain variants and may provide updates in the future.    What Does a Negative Genetic Test Result Mean?  A negative genetic test results means that no genetic changes (variants) were detected in the genes tested. While no inherited risk factors for cancer were identified, you and your family may be at risk for certain cancers due to family history, environmental factors, or other genetic causes not identified by this test.  It is also possible that your family may still be at risk of carrying a genetic risk factor which you did not inherit. Your genetic counselor can help interpret the result for you and your relatives.      It is important to note which genes were included in your test. A list of these genes can be found on your test result.    Screening Recommendations  A combination of personal and family history factors may inform cancer risk and medical management recommendations.   Population cancer screening options, such as those recommended by the [employer] Cancer Society and the National Comprehensive Cancer Network (NCCN) are appropriate for many families at average risk for cancer.  However, earlier and/or more frequent screening may be recommended based on personal factors (lifestyle, exposures, medications, screening results), family history of cancer, and sometimes genetic factors.  These cancer risk management options should be discussed in more detail with an individual's medical providers.      Please call us if you have any questions or concerns.   Cancer Risk Management Program (Appointments: [phone])

## 2024-10-29 ENCOUNTER — LAB REQUISITION (OUTPATIENT)
Dept: LAB | Facility: CLINIC | Age: 34
End: 2024-10-29

## 2024-10-29 DIAGNOSIS — R31.9 HEMATURIA, UNSPECIFIED: ICD-10-CM

## 2024-10-29 PROCEDURE — 87186 SC STD MICRODIL/AGAR DIL: CPT | Performed by: STUDENT IN AN ORGANIZED HEALTH CARE EDUCATION/TRAINING PROGRAM

## 2024-10-31 LAB — BACTERIA UR CULT: ABNORMAL

## 2025-01-03 ENCOUNTER — LAB REQUISITION (OUTPATIENT)
Dept: LAB | Facility: CLINIC | Age: 35
End: 2025-01-03

## 2025-01-03 DIAGNOSIS — R30.0 DYSURIA: ICD-10-CM

## 2025-01-03 PROCEDURE — 87186 SC STD MICRODIL/AGAR DIL: CPT | Performed by: FAMILY MEDICINE

## 2025-01-05 LAB — BACTERIA UR CULT: ABNORMAL

## 2025-04-22 DIAGNOSIS — F51.04 CHRONIC INSOMNIA: Primary | ICD-10-CM

## 2025-04-23 ENCOUNTER — PATIENT OUTREACH (OUTPATIENT)
Dept: CARE COORDINATION | Facility: CLINIC | Age: 35
End: 2025-04-23
Payer: COMMERCIAL